# Patient Record
Sex: FEMALE | Race: WHITE | NOT HISPANIC OR LATINO | Employment: OTHER | URBAN - METROPOLITAN AREA
[De-identification: names, ages, dates, MRNs, and addresses within clinical notes are randomized per-mention and may not be internally consistent; named-entity substitution may affect disease eponyms.]

---

## 2017-01-03 ENCOUNTER — OFFICE VISIT (OUTPATIENT)
Dept: INTERNAL MEDICINE | Facility: CLINIC | Age: 77
End: 2017-01-03
Payer: MEDICARE

## 2017-01-03 ENCOUNTER — LAB VISIT (OUTPATIENT)
Dept: LAB | Facility: HOSPITAL | Age: 77
End: 2017-01-03
Attending: PHYSICIAN ASSISTANT
Payer: MEDICARE

## 2017-01-03 VITALS
OXYGEN SATURATION: 99 % | HEART RATE: 86 BPM | TEMPERATURE: 99 F | DIASTOLIC BLOOD PRESSURE: 72 MMHG | WEIGHT: 119.5 LBS | HEIGHT: 61 IN | BODY MASS INDEX: 22.56 KG/M2 | SYSTOLIC BLOOD PRESSURE: 102 MMHG

## 2017-01-03 DIAGNOSIS — R17 ELEVATED BILIRUBIN: Primary | ICD-10-CM
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
## 2017-01-03 DIAGNOSIS — D64.9 MILD ANEMIA: ICD-10-CM

## 2017-01-03 LAB
ALBUMIN SERPL BCP-MCNC: 3.8 G/DL
ALP SERPL-CCNC: 79 U/L
ALT SERPL W/O P-5'-P-CCNC: 15 U/L
ANION GAP SERPL CALC-SCNC: 11 MMOL/L
AST SERPL-CCNC: 20 U/L
BASOPHILS # BLD AUTO: 0.02 K/UL
BASOPHILS NFR BLD: 0.2 %
BILIRUB SERPL-MCNC: 1.4 MG/DL
BUN SERPL-MCNC: 17 MG/DL
CALCIUM SERPL-MCNC: 10.1 MG/DL
CHLORIDE SERPL-SCNC: 103 MMOL/L
CO2 SERPL-SCNC: 24 MMOL/L
CREAT SERPL-MCNC: 0.8 MG/DL
DIFFERENTIAL METHOD: ABNORMAL
EOSINOPHIL # BLD AUTO: 0.1 K/UL
EOSINOPHIL NFR BLD: 0.5 %
ERYTHROCYTE [DISTWIDTH] IN BLOOD BY AUTOMATED COUNT: 13.4 %
EST. GFR  (AFRICAN AMERICAN): >60 ML/MIN/1.73 M^2
EST. GFR  (NON AFRICAN AMERICAN): >60 ML/MIN/1.73 M^2
GLUCOSE SERPL-MCNC: 132 MG/DL
HCT VFR BLD AUTO: 33.2 %
HGB BLD-MCNC: 11.2 G/DL
LYMPHOCYTES # BLD AUTO: 2 K/UL
LYMPHOCYTES NFR BLD: 19.5 %
MCH RBC QN AUTO: 32.8 PG
MCHC RBC AUTO-ENTMCNC: 33.7 %
MCV RBC AUTO: 97 FL
MONOCYTES # BLD AUTO: 0.8 K/UL
MONOCYTES NFR BLD: 8.3 %
NEUTROPHILS # BLD AUTO: 7.3 K/UL
NEUTROPHILS NFR BLD: 71.5 %
PLATELET # BLD AUTO: 346 K/UL
PMV BLD AUTO: 9.1 FL
POTASSIUM SERPL-SCNC: 4.2 MMOL/L
PROT SERPL-MCNC: 7.7 G/DL
RBC # BLD AUTO: 3.41 M/UL
SODIUM SERPL-SCNC: 138 MMOL/L
WBC # BLD AUTO: 10.14 K/UL

## 2017-01-03 PROCEDURE — 99999 PR PBB SHADOW E&M-EST. PATIENT-LVL IV: CPT | Mod: PBBFAC,,, | Performed by: PHYSICIAN ASSISTANT

## 2017-01-03 PROCEDURE — 80053 COMPREHEN METABOLIC PANEL: CPT | Mod: PO

## 2017-01-03 PROCEDURE — 36415 COLL VENOUS BLD VENIPUNCTURE: CPT | Mod: PO

## 2017-01-03 PROCEDURE — 99213 OFFICE O/P EST LOW 20 MIN: CPT | Mod: S$PBB,,, | Performed by: PHYSICIAN ASSISTANT

## 2017-01-03 PROCEDURE — 85025 COMPLETE CBC W/AUTO DIFF WBC: CPT | Mod: PO

## 2017-01-03 RX ORDER — LISINOPRIL 5 MG/1
5 TABLET ORAL DAILY
COMMUNITY
End: 2017-11-01

## 2017-01-03 NOTE — PROGRESS NOTES
"  Subjective:      Patient ID: Aicha Rolle is a 76 y.o. female.    Chief Complaint: Fall    HPI Comments: Does admit to some weakness and mild dizziness in the past few days lasting a few minutes. Reports to "feeling like she is in a bubble". Doesn't feel like herself since this morning. Saw her ortho doc after the fall, did x-rays of hip and back, no acute findings. Did not have any imaging of her head.   On plavix. Pt states that she stopped the plavix 2 days ago.   Denies any confusion, visual changes, or weakness.    Fall   The accident occurred 5 to 7 days ago. The fall occurred from a stool. She landed on hard floor. The volume of blood lost was minimal. The point of impact was the head. The pain is present in the buttocks. Associated symptoms include abdominal pain and headaches (mild sinus headache today, no headache after fall). Pertinent negatives include no bowel incontinence, fever, hearing loss, hematuria, loss of consciousness, nausea, numbness, tingling, visual change or vomiting. She has tried nothing for the symptoms. The treatment provided no relief.       Review of Systems   Constitutional: Negative for activity change, appetite change, chills, diaphoresis, fatigue, fever and unexpected weight change.   HENT: Positive for congestion, ear pain and sinus pressure. Negative for dental problem, drooling, ear discharge, facial swelling, hearing loss, postnasal drip, rhinorrhea, sore throat and tinnitus.    Respiratory: Negative for chest tightness, shortness of breath and wheezing.    Cardiovascular: Negative for chest pain, palpitations and leg swelling.   Gastrointestinal: Positive for abdominal pain. Negative for abdominal distention, anal bleeding, blood in stool, bowel incontinence, constipation, diarrhea, nausea, rectal pain and vomiting.   Genitourinary: Negative for hematuria.   Musculoskeletal: Positive for arthralgias and myalgias.   Neurological: Positive for dizziness and headaches (mild " "sinus headache today, no headache after fall). Negative for tingling, loss of consciousness, facial asymmetry and numbness.     Objective:     Visit Vitals    /72 (BP Location: Right arm, Patient Position: Sitting, BP Method: Manual)    Pulse 86    Temp 99 °F (37.2 °C) (Tympanic)    Ht 5' 1" (1.549 m)    Wt 54.2 kg (119 lb 7.8 oz)    SpO2 99%    BMI 22.58 kg/m2       Physical Exam   Constitutional: She is oriented to person, place, and time. She appears well-developed and well-nourished.   HENT:   Head: Normocephalic and atraumatic.       Right Ear: External ear normal.   Left Ear: External ear normal.   Nose: Nose normal.   Mouth/Throat: Oropharynx is clear and moist.   Eyes: Conjunctivae and EOM are normal. Pupils are equal, round, and reactive to light.   Neck: Normal range of motion. Neck supple.   Cardiovascular: Normal rate, regular rhythm and normal heart sounds.  Exam reveals no gallop and no friction rub.    No murmur heard.  Pulmonary/Chest: Effort normal and breath sounds normal. No respiratory distress. She has no wheezes. She has no rales. She exhibits no tenderness.   Abdominal: Soft. She exhibits no distension. There is no tenderness.   Musculoskeletal: Normal range of motion.   Lymphadenopathy:     She has no cervical adenopathy.   Neurological: She is alert and oriented to person, place, and time. She has normal strength. No cranial nerve deficit or sensory deficit. She exhibits normal muscle tone. Coordination and gait normal.   Skin: Skin is warm and dry.   Psychiatric: She has a normal mood and affect. Her behavior is normal. Judgment and thought content normal.   Vitals reviewed.      Assessment:     1. Fall, subsequent encounter      Plan:   Fall, subsequent encounter  -     CBC auto differential; Future; Expected date: 1/3/17  -     Comprehensive metabolic panel; Future    -continue to monitor. If notice any worsening of headache, dizziness, visual changes, weakness, n/v, " incontinence, confusion, go to ER.   -a handout on concussions and head injury and warning signs sent home with the patient today.  -restart plavix    Return if symptoms worsen or fail to improve.

## 2017-01-03 NOTE — MR AVS SNAPSHOT
[facility]  [address] [facility] [address] [place] [address]  Phone: [phone]  Fax: [phone]                  Aicha Rolle   1/3/2017 11:20 AM   Office Visit    Description:  Female : 1940   Provider:  Yana Rascon PA-C   Department:  [facility]           Reason for Visit     Fall                To Do List           Goals (5 Years of Data)     None      [facility]     [facility] On Call Nurse Care Line -  Assistance  Registered nurses in the [facility] Call Center provide clinical advisement, health education, appointment booking, and other advisory services.  Call for this free service at [phone].             Medications           Message regarding Medications     Verify the changes and/or additions to your medication regime listed below are the same as discussed with your clinician today.  If any of these changes or additions are incorrect, please notify your healthcare provider.             Verify that the below list of medications is an accurate representation of the medications you are currently taking.  If none reported, the list may be blank. If incorrect, please contact your healthcare provider. Carry this list with you in case of emergency.           Current Medications     atorvastatin (LIPITOR) 80 MG tablet Take 1 tablet (80 mg total) by mouth once daily.    biotin 1 mg tablet Take 1,000 mcg by mouth once daily.    CALCIUM CARBONATE/VITAMIN D3 (CALTRATE 600 + D ORAL) Take 1 tablet by mouth once daily.    carvedilol (COREG) 25 MG tablet Take 25 mg by mouth 2 (two) times daily with meals. Patient states taking 1/2 pill twice daily    clopidogrel (PLAVIX) 75 mg tablet Take 75 mg by mouth once daily.     cyanocobalamin (VITAMIN B-12) 250 MCG tablet Take 250 mcg by mouth once daily.    fluticasone (FLONASE) 50 mcg/actuation nasal spray INSTILL 2 SPRAYS IN EACH NOSTRIL EVERY EVENING    lisinopril (PRINIVIL,ZESTRIL) 5 MG tablet Take 5 mg by mouth  "once daily.    NITROSTAT 0.4 mg SL tablet            Clinical Reference Information           Vital Signs - Last Recorded  Most recent update: 1/3/2017 11:28 AM by Brandy Buchanan LPN    BP Pulse Temp Ht Wt SpO2    102/72 (BP Location: Right arm, Patient Position: Sitting, BP Method: Manual) 86 99 °F (37.2 °C) (Tympanic) 5' 1" (1.549 m) 54.2 kg (119 lb 7.8 oz) 99%    BMI                22.58 kg/m2          Blood Pressure          Most Recent Value    BP  102/72      Allergies as of 1/3/2017     Sulfa (Sulfonamide Antibiotics)      Immunizations Administered on Date of Encounter - 1/3/2017     None      [facility] Sign-Up     Activating your [facility] account is as easy as 1-2-3!     1) Visit [email or web address], select Sign Up Now, enter this activation code and your date of birth, then select Next.  [email or web address]  Expires: 2/17/2017 11:48 AM      2) Create a username and password to use when you visit [facility] in the future and select a security question in case you lose your password and select Next.    3) Enter your e-mail address and click Sign Up!    Additional Information  If you have questions, please e-mail [email or web address] or call [phone] to talk to our [facility] staff. Remember, [facility] is NOT to be used for urgent needs. For medical emergencies, dial 911.         "

## 2017-01-03 NOTE — PATIENT INSTRUCTIONS
"  Concussion    A concussion can be caused by a direct blow to the head, neck, face, or somewhere else on the body with the force being transmitted to the head. This may cause you to lose consciousness - be "knocked out" - but not always. Depending on the severity of the blow, it will take from a few hours up to a few days to get better. Sometimes symptoms may last a few months or longer. This is called post-concussion syndrome.  At first, you may have a headache, nausea, vomiting, or dizziness. You may also have problems concentrating or remembering things. This is normal.  Symptoms should get better as the hours and days go by. Symptoms that get worse could be a sign of a more serious injury. This might be a bruise or bleeding in the brain. Thats why its important to watch for the warning signs listed below.  Home care  If your injury is mild and there are no serious signs or symptoms, your healthcare provider may recommend that you be monitored at home. If there is evidence that the injury is more serious, you will be monitored in the hospital. Follow these tips to help care for yourself at home:  · After a concussion, your healthcare provider may recommend that a family member or friend monitor you for 12 to 24 hours. They may be told to wake you every few hours during sleep to check for the signs below.  · If your face or scalp swells, apply an ice pack for 20 minutes every 1 to 2 hours. Do this until the swelling starts to go down. You can make an ice pack by putting ice cubes in a plastic bag and wrapping the bag in a towel.  · You may use acetaminophen to control pain, unless another pain medicine was prescribed. Do not use aspirin or ibuprofen after a head injury. If you have chronic liver or kidney disease, talk with your doctor before using these medicines. Also talk with your doctor if you ever had a stomach ulcer or gastrointestinal bleeding.  · For the next 24 hours:  ¨ Dont drink alcohol or take " sedatives or medicines that make you sleepy.  ¨ Dont drive or operate machinery.  ¨ Avoid doing anything strenuous. Dont lift or strain.  · Dont return to sports or any activity that could cause you to hit your head until all symptoms are gone and you have been cleared by your doctor. A second head injury before fully recovering from the first one can lead to serious brain injury.  · Avoid doing activities that require a lot of concentration or a lot of attention. This will allow your brain to rest and heal quicker.  Follow-up care  Follow up with your doctor in 1 week, or as directed.  Note: A radiologist will review any X-rays or CT scans that were taken. You will be told of any new findings that may affect your care.  When to seek medical advice  Call your healthcare provider right away if any of these occur:  · Repeated vomiting  · Headache or dizziness that is severe or gets worse  · Loss of consciousness  · Unusual drowsiness, or unable to wake up as usual  · Weakness or decreased ability to walk or move any limb  · Confusion, agitation, or change in behavior or speech, or memory loss  · Blurred vision  · Convulsion (seizure)  · Swelling on the scalp or face that gets worse  · Changes in pupil size (the black part of the eye)  · Redness, warmth, or pus from the swollen area  · Fluid draining from or bleeding from the nose or ears     © 9293-4324 [employer] [address] [place] [address], [place] [address]. All rights reserved. This information is not intended as a substitute for professional medical care. Always follow your healthcare professional's instructions.        After a Concussion  If you or someone close to you has had a mild concussion (a head injury), watch closely for signs of problems during the first 48 hours after the injury. Follow the doctors advice about recovering at home. Use the tips on this handout as a guide.  Call 911 or your emergency number if the person with the  concussion will not fully wake up or has seizures or convulsions.   The first 48 hours     Awaken to check alertness as often as the health care provider suggests.      Dont take medicine unless approved by your healthcare provider. Try placing a cold, damp cloth on the head to help relieve a headache.  · Ask the doctor before using any medicines.  · Don't drink alcohol or take sedatives or medicines that make you sleepy.  · Don't return to sports or any activity that could cause you to hit your head until all symptoms are gone and you have been cleared by your doctor. A second head injury before fully recovering from the first one can lead to serious brain injury.  · Avoid doing activities that require a lot of concentration or a lot of attention. This will allow your brain to rest and heal more quickly.  · Return to regular physical and mental activity as directed and approved by your healthcare provider.  Tips about sleeping  For the first day or two, it may be best not to sleep for long periods of time without being checked for alertness. Follow the doctors instructions.  ? Wake every ____ hours for the next ____ hours. Ask questions to check for alertness.  ? OK to sleep through the night.  Note: A person should not be left alone after a concussion. If no adult can stay with the injured person, let the doctor know.  When to call the doctor  If you notice any of the following, call the doctor or healthcare provider:  · Vomiting (some vomiting is common, but tell the doctor about any vomiting)  · Clear or bloody drainage from the nose or ear  · Constant drowsiness or difficulty in waking up  · Confusion or memory loss  · Blurred vision or any vision changes  · Inability to walk or talk normally  · Increased weakness or problems with coordination  · Constant, unrelieved headache that becomes more severe  · Changes in behavior or personality  · High-pitched crying in infants   © 8646-3174 [employer],  [employer]. [address] [place] [address]. All rights reserved. This information is not intended as a substitute for professional medical care. Always follow your healthcare professional's instructions.        Coping with Concussion  Concussion is also known as mild traumatic brain injury (MTBI). It is often caused by a blow to the head, or a fall. You may have been unconscious for a few seconds or minutes after the injury. Or maybe you were dazed, confused, or saw stars. After this, you thought you were OK. Now, weeks or months later, youre having symptoms that may be caused by a concussion. The good news is that, in most people,  these symptoms will likely go away on their own. Most people with a concussion recover fully, with no need for treatment.     A cold compress can help relieve a headache.    What is a concussion?  A concussion is a mild form of brain injury. In some cases, the effects of a concussion go away within days of the injury. In others, symptoms may continue for a few months. Fortunately, a concussion is temporary. Even when symptoms stay for months, they do go away over time. If they don't, or if your symptoms are worse, contact your healthcare provider.  Symptoms of a concussion  You may have noticed some of these symptoms:  · Headaches  · Irritability and other changes in behavior  · Problems remembering or concentrating  · Dizziness or lack of coordination  · Fatigue  · Problems sleeping  · Sensitivity to light and sound  · Vision changes  NOTE: If you have severe symptoms or trouble functioning, talk with your healthcare provider right away. If you had a more serious head injury than a concussion, you likely need treatment. Be sure to see your healthcare provider for an evaluation.   What you can do  Since the effects of a concussion go away over time, there isnt a lot you need to do. Be assured that this problem is temporary. Youll likely have a full recovery. In the meantime,  talk with your healthcare provider about ways to relieve any symptoms that are bothering you. These tips may help:  · Don't return to sports or any activity that could cause you to hit your head until all symptoms are gone and you have been cleared by your doctor. A second head injury before fully recovering from the first one can lead to serious brain injury.  · Avoid doing activities that require a lot of concentration or a lot of attention. This will allow your brain to rest and heal more quickly.  · When you have a headache, put a cold compress or ice pack on the pain site. Rest in a quiet, darkened room.  · Stress can make symptoms worse. Help calm yourself by resting in a quiet place and imagining a peaceful scene. Relax your muscles by soaking in a hot bath or taking a hot shower.  · Take over-the-counter  acetaminophen to relieve headache pain. Take them as directed on the package. Do not take ibuprofen or aspirin after a head injury.  · If you become dizzy, sit or lie down in a safe place until the sensation passes. Dont drive when you feel dizzy or disoriented.  · If youre having trouble sleeping, try to keep a regular sleep schedule. Go to bed and get up at the same time each day. Avoid or limit caffeine and nicotine. Also avoid alcohol. It may help you sleep at first, but your sleep will not be restful.  · Give yourself time to heal. Your recovery will take some time. When you have symptoms, remember that you wont feel this way forever. In time the symptoms will go away and youll be back to yourself.  If youre not feeling better  The effects of a concussion often go away in 7 to 10 days and the vast majority of people who have had a concussion have recovered after 3 months. If youre not feeling better as time passes, there may be something else going on. If your symptoms dont go away or you notice new ones, talk with your healthcare provider. He or she can help you get the treatment you need.   ©  1140-8343 [employer]. [address] [place] [address], [place] [address]. All rights reserved. This information is not intended as a substitute for professional medical care. Always follow your healthcare professional's instructions.        Discharge Instructions for Concussion  You have been diagnosed with a concussion, a type of brain injury caused by a sudden impact to your head. It can also be caused by sudden movement of your brain inside your head, such as from forceful shaking. Some concussions are mild, and most patients have a full recovery. Others are severe. Early care and monitoring are important to prevent long-term complications.  Home care  Do's and don'ts:   · Ask a friend or family member to stay with you for a few days. You should not be alone until you know how the injury has affected you.  · Tell your caregiver to wake you every 2 to 3 hours during the first night. Your caregiver should call 911 if he or she cant wake you, or if you are confused.  · Dont take any medicine--not even aspirin--unless your healthcare provider says it's OK. If you have a headache, try placing a cold, damp cloth on your forehead.  · Eat light. Clear liquids, such as broth or gelatin, are good choices.  · Don't drink alcohol or use any recreational drugs.   · Rest for 2 to 3 days; then slowly return to normal activities, such as school or work, if your healthcare provider allows. Avoid contact activities, such as football and hockey, until you are completely free of symptoms and your healthcare provider says it's OK.  Follow-up  Make a follow-up appointment.  When to seek medical attention  Your caregiver should call 911 right away if you have fallen asleep, cannot be awakened, or you are confused.  Otherwise, call your healthcare provider immediately if you have:  · Vomiting  · Clear or bloody drainage from your nose or ear  · Constant drowsiness or trouble waking up  · Confusion or memory loss  · Blurred  vision  · Trouble walking, talking, or concentrating  · Increased weakness or problems with coordination  · Constant headache that cant be relieved or gets worse  · Changes in behavior or personality   © 9484-5161 [employer]. [address] [place] [address], [place] [address]. All rights reserved. This information is not intended as a substitute for professional medical care. Always follow your healthcare professional's instructions.        Head Injury (Adult)    You have a head injury. It does not appear serious at this time. But symptoms of a more serious problem, such as a mild brain injury (concussion) or bruising or bleeding in the brain, may appear later. For this reason, you or someone caring for you will need to watch for the symptoms listed below. Once youre home, also be sure to follow any care instructions youre given.  Home care  Watch for the following symptoms  Seek emergency medical care if you have any of these symptoms over the next hours to days:   · Headache  · Nausea or vomiting  · Dizziness  · Sensitivity to light or noise  · Unusual sleepiness or grogginess  · Trouble falling asleep  · Personality changes  · Vision changes  · Memory loss  · Confusion  · Trouble walking or clumsiness  · Loss of consciousness (even for a short time)  · Inability to be awakened  · Stiff neck  · Weakness or numbness in any part of the body  · Seizures  General care  · If you were prescribed medicines for pain, use them as directed. Note: Dont take other medicines for pain without talking to your provider first.  · To help reduce swelling and pain, apply a cold source to the injured area for up to 20 minutes at a time. Do this as often as directed. Use a cold pack or bag of ice wrapped in a thin towel. Never apply a cold source directly to the skin.  · If you have cuts or scrapes as a result of your head injury, care for them as directed.  · For the next 24 hours (or longer, if instructed):  ¨ Dont drink  alcohol or use sedatives or other medicines that make you sleepy.  ¨ Dont drive or operate machinery.  ¨ Dont do anything strenuous, such as heavy lifting or straining.  ¨ Limit tasks that require concentration. This includes reading, using a smartphone or computer, watching TV, and playing video games.  ¨ Dont return to sports or other activities that could result in another head injury.  Follow-up care  Follow up with your healthcare provider, or as directed. If imaging tests were done, they will be reviewed by a doctor. You will be told the results and any new findings that may affect your care.  When to seek medical advice  Call your healthcare provider right away if any of these occur:  · Pain doesnt get better or worsens  · New or increased swelling or bruising  · Fever of 100.4°F (38°C) or higher, or as directed by your provider  · Increased redness, warmth, drainage, or bleeding from the injured area  · Fluid drainage or bleeding from the nose or ears  · Any depression or bony abnormality in the injured area  © 3273-4129 [employer]. [address] [place] [address]. All rights reserved. This information is not intended as a substitute for professional medical care. Always follow your healthcare professional's instructions.        What is Mild Traumatic Brain Injury (Concussion)?  A mild traumatic brain injury (TBI) is a sudden jolt to your head that causes the way your brain works to change temporarily. It is also called a concussion. This could be caused by a blow to your head, a blast, or a sudden and severe movement of your head that bounces your brain inside your skull. Falls, fights, sports, and motor vehicle accidents are other common causes.  In addition to mild TBI, there are two other types: moderate TBI and severe TBI. Your healthcare team will decide if your TBI is mild, moderate, or severe at the time of the injury. Sometimes the symptoms of a mild TBI are much like  those of a more severe TBI. Because every brain is different, it can be hard to predict exactly what your symptoms or your specific recovery will be like.  Diagnosing a mild TBI  Most TBIs are mild. If you have a mild TBI, you might be knocked out for a short time or you might just feel stunned for a while. Your healthcare provider may evaluate you to see if you have had a mild TBI.   Diagnosing a mild TBI may be difficult because symptoms are similar to those of other conditions and signs may not show up on brain scans or X-rays. Your healthcare provider may base your diagnosis on the following criteria;  · Characteristics of the head injury  · The types and severity of your symptoms  · Risk factors that can lead to longer recovery period  Symptoms of a mild TBI  Having a mild TBI can change your brain in many ways. A mild TBI can change the way you think, feel, or act. The kind of symptoms you have depends on the location and extent that your brain is affected. Common symptoms of mild TBI can occur right away or a while after the injury. Early symptoms may include:  · Headache  · Dizziness  · Not being sure of where you are  · Memory problems  · Being sick to your stomach  · Vomiting  · Vision problems  Later symptoms of a mild TBI may include:  · Having frequent headaches  · Feeling light-headed  · Not being able to concentrate or pay attention  · Feeling tired most of the time  · Getting angry and irritated easily  · Being bothered by bright light or loud noise  · Having trouble focusing your eyes  · Hearing ringing in your ears  · Feeling anxious and depressed  Recovering from a mild TBI  Most people recover completely from mild TBI, but it may take days, weeks, or months. For some, symptoms may last even longer. Also, if you have had more than one TBI, your recovery may take longer. Every brain is different, so your recovery time will depend on how quickly your own brain is healing.  Don't return to sports or  any activity that could cause you to hit your head until all symptoms are gone and you have been cleared by your doctor. A second head injury before fully recovering from the first one can lead to serious brain injury.  Avoid doing activities that require a lot of concentration or a lot of attention. This will allow your brain to rest and heal more quickly.  You can expect to have some good days and some bad days. It is important to give your brain time to recover and not push yourself too hard. Trying to tough it out can make your symptoms worse. The best way to recover is to discuss symptoms with your healthcare provider, as well as your family. Work closely with your healthcare provider and give your brain time to heal.  © 4838-8395 [employer]. [address] [place] [address], [place] [address]. All rights reserved. This information is not intended as a substitute for professional medical care. Always follow your healthcare professional's instructions.        Managing Post-Traumatic Headaches After Traumatic Brain Injury  A traumatic brain injury (TBI) is a sudden jolt to your head that changes the way your brain works. Its not surprising that headache would be the most common physical symptom after a brain injury. Because a jolt to your head also causes a jolt to your neck, headaches with neck pain are the most common types of pain after a TBI.  The type of headache you get does not depend on the severity of your TBI is. Thats because symptoms after a TBI are unpredictable. You could have a mild TBI but still have very painful headaches. Also, having headaches can make your other TBI symptoms worse, and other TBI symptoms can make your headaches worse. Because of this, its important to learn how to manage your headaches.  Types of headaches after a TBI   Your headaches may be mild or severe. They may come and go, or you may have them all the time. TBI symptoms, such as trouble sleeping, anxiety,  fatigue, depression, slowed thinking, and memory loss, can all be made worse by headaches.  Here are some common types of TBI headaches:  · Headaches that start with pain in the back of your neck and spread to your head. These headaches get worse as the day goes on. They are more likely if your TBI includes a neck injury. This type of headache is often called a tension headache. It is probably the most common type of TBI headache.  · Migraine headaches. These may be triggered by TBI, especially if you have a family history of migraines. A migraine headache is a pounding headache, usually on one side of your head. Its caused by abnormal blood flow to your brain. Stressful symptoms of a TBI may trigger a migraine attack.  Managing TBI headaches  Strong pain medicines, called opiates or narcotics, are usually not the answer for TBI headaches. These medicines can make other TBI symptoms worse. They also can have unpredictable side effects in a person with a TBI.  If pain medicines are needed, the first choice is a nonnarcotic medicine. Examples include over-the-counter pain relievers like acetaminophen or ibuprofen. If you use pain relief medicines for a headache more than 3 days a week, you need to watch if your headaches are getting worse. This is called rebound headache.  The best way to manage your headaches is with self-care, also called headache hygiene. Here are some self-care tips:  · Take medicines only as your healthcare provider prescribes them. Dont take any medicines on your own.  · If you start getting a headache, try to find a dark, quiet place where you can lie down.  · Wear dark glasses if bright lights seem to trigger your headaches.  · Avoid any foods and beverages that seem to trigger a headache.  · Learn to avoid stress and relax by using techniques like listening to music, meditating, or deep breathing. If needed, work with a mental health expert to reduce stress and anxiety.  · Get daily  exercise. This helps your body and your brain.  · Go to bed and get up at the same time every day.  · Avoid caffeine, alcohol, and tobacco.  · Take part in physical therapy to stretch and strengthen your muscles. Learn how to do these exercises at home.  · Think about trying alternative treatments like acupuncture or massage therapy.  · Keep a headache journal to share with your healthcare provider. Write down every time you get a headache, how severe it is, and what seemed to have triggered it.  TBI headaches usually go away with time. How long it takes your brain to recover depends on the type of injury you had. Managing headache pain with self-care can help you heal faster. Call your healthcare provider if your pain is getting worse. And remember, never try to treat headaches on your own with drugs or alcohol.  © 6162-4539 [employer], [employer]. [address] [place] [address], [place] [address]. All rights reserved. This information is not intended as a substitute for professional medical care. Always follow your healthcare professional's instructions.

## 2017-01-04 ENCOUNTER — TELEPHONE (OUTPATIENT)
Dept: INTERNAL MEDICINE | Facility: CLINIC | Age: 77
End: 2017-01-04

## 2017-01-04 NOTE — TELEPHONE ENCOUNTER
Spoke to pt. Pt states she has blood in her urine  And blood when she wipes. i recommend pt schedule an appt to be seen. Pt states she will come in radha. Pt verbalized understanding

## 2017-01-04 NOTE — TELEPHONE ENCOUNTER
----- Message from Milena Denton sent at 1/4/2017  2:19 PM CST -----  Contact: pt  Pt requests nurse to call her at [phone] regarding if she need to have a urine test performed due to seeing a little blood on tissue after urinating.

## 2017-01-05 ENCOUNTER — LAB VISIT (OUTPATIENT)
Dept: LAB | Facility: HOSPITAL | Age: 77
End: 2017-01-05
Attending: PEDIATRICS
Payer: MEDICARE

## 2017-01-05 ENCOUNTER — OFFICE VISIT (OUTPATIENT)
Dept: INTERNAL MEDICINE | Facility: CLINIC | Age: 77
End: 2017-01-05
Payer: MEDICARE

## 2017-01-05 VITALS
SYSTOLIC BLOOD PRESSURE: 120 MMHG | TEMPERATURE: 97 F | BODY MASS INDEX: 22.73 KG/M2 | HEIGHT: 61 IN | DIASTOLIC BLOOD PRESSURE: 74 MMHG | WEIGHT: 120.38 LBS

## 2017-01-05 DIAGNOSIS — R31.9 HEMATURIA: ICD-10-CM

## 2017-01-05 DIAGNOSIS — R31.9 HEMATURIA: Primary | ICD-10-CM

## 2017-01-05 DIAGNOSIS — N30.90 CYSTITIS: ICD-10-CM

## 2017-01-05 LAB
BACTERIA #/AREA URNS HPF: ABNORMAL /HPF
BILIRUB UR QL STRIP: NEGATIVE
CLARITY UR: CLEAR
COLOR UR: ABNORMAL
GLUCOSE UR QL STRIP: NEGATIVE
HGB UR QL STRIP: ABNORMAL
KETONES UR QL STRIP: ABNORMAL
LEUKOCYTE ESTERASE UR QL STRIP: ABNORMAL
MICROSCOPIC COMMENT: ABNORMAL
NITRITE UR QL STRIP: NEGATIVE
PH UR STRIP: 6 [PH] (ref 5–8)
PROT UR QL STRIP: NEGATIVE
RBC #/AREA URNS HPF: 10 /HPF (ref 0–4)
SP GR UR STRIP: 1.02 (ref 1–1.03)
SQUAMOUS #/AREA URNS HPF: 2 /HPF
URN SPEC COLLECT METH UR: ABNORMAL
WBC #/AREA URNS HPF: 15 /HPF (ref 0–5)

## 2017-01-05 PROCEDURE — 87086 URINE CULTURE/COLONY COUNT: CPT

## 2017-01-05 PROCEDURE — 81000 URINALYSIS NONAUTO W/SCOPE: CPT | Mod: PO

## 2017-01-05 PROCEDURE — 99999 PR PBB SHADOW E&M-EST. PATIENT-LVL III: CPT | Mod: PBBFAC,,, | Performed by: PHYSICIAN ASSISTANT

## 2017-01-05 PROCEDURE — 99213 OFFICE O/P EST LOW 20 MIN: CPT | Mod: S$PBB,,, | Performed by: PHYSICIAN ASSISTANT

## 2017-01-05 RX ORDER — CIPROFLOXACIN 500 MG/1
500 TABLET ORAL DAILY
Qty: 10 TABLET | Refills: 0 | Status: SHIPPED | OUTPATIENT
Start: 2017-01-05 | End: 2017-01-15

## 2017-01-05 NOTE — PROGRESS NOTES
Subjective:       Patient ID: Aicha Rolle is a 76 y.o.W/ female.    Chief Complaint: Urinary Tract Infection    HPI         She comes in today accompanied by her  and has the above problems.  She noticed a clot of blood in her urine this morning and is worried that she might have a urinary tract infection.  She has also had some slight vaginal discharge.  She hasn't noticed any blood on her toilet paper after wiping.  There has been no blood in the water or pink tinge to the water.  She has no fever, sense of urgency, polyuria, dysuria, pyuria, nocturia, suprapubic or low backache, or GI symptoms of nausea anorexia or vomiting.  She did have a urinary tract infection about 13 months ago in December 2015.  At that time she had a lot more symptoms that she has now.    Review of Systems    Otherwise negative concerning the GI, MUSCULOSKELETAL, ORTHOPEDIC,  and GYN systems review.    Objective:      Physical Exam   See urine report located elsewhere in the lab section.     Assessment:       1. Hematuria    2. Cystitis        Plan:     1.  Will start her on Cipro 500 mg daily ×7 days.  She is ALLERGIC to sulfa meds.  Follow-up in 7 days if she has anymore symptoms or if the symptoms persist.

## 2017-01-06 LAB
BACTERIA UR CULT: NORMAL
BACTERIA UR CULT: NORMAL

## 2017-01-20 ENCOUNTER — TELEPHONE (OUTPATIENT)
Dept: INTERNAL MEDICINE | Facility: CLINIC | Age: 77
End: 2017-01-20

## 2017-01-20 NOTE — TELEPHONE ENCOUNTER
Contacted pt notified that she would have to come in to an appt and give a urine sample. Pt verbalized understanding, appt is 1/24/17 9:00AM.

## 2017-01-20 NOTE — TELEPHONE ENCOUNTER
----- Message from Apple Hicks sent at 1/20/2017  2:24 PM CST -----  Contact: pt  States she had a UTI 2 weeks ago and she has finished the medicine.  States is having some burning and frequent urination again and she would like to get something called in before the weekend. Pt uses     [facility] [place] - [address] [facility]  [id number] [place] [address].  [address]  [facility] [address]  Phone: [phone] Fax: [phone]    Please call pt at [phone] or [phone]. Thank you

## 2017-01-24 ENCOUNTER — LAB VISIT (OUTPATIENT)
Dept: LAB | Facility: HOSPITAL | Age: 77
End: 2017-01-24
Attending: PEDIATRICS
Payer: MEDICARE

## 2017-01-24 ENCOUNTER — OFFICE VISIT (OUTPATIENT)
Dept: INTERNAL MEDICINE | Facility: CLINIC | Age: 77
End: 2017-01-24
Payer: MEDICARE

## 2017-01-24 VITALS
DIASTOLIC BLOOD PRESSURE: 76 MMHG | TEMPERATURE: 97 F | WEIGHT: 111.13 LBS | HEIGHT: 61 IN | SYSTOLIC BLOOD PRESSURE: 112 MMHG | BODY MASS INDEX: 20.98 KG/M2 | RESPIRATION RATE: 14 BRPM | HEART RATE: 61 BPM

## 2017-01-24 DIAGNOSIS — R30.0 DYSURIA: ICD-10-CM

## 2017-01-24 DIAGNOSIS — N30.21 HEMATURIA DUE TO CHRONIC CYSTITIS: Primary | ICD-10-CM

## 2017-01-24 DIAGNOSIS — R30.0 DYSURIA: Primary | ICD-10-CM

## 2017-01-24 LAB
BILIRUB UR QL STRIP: NEGATIVE
CLARITY UR: CLEAR
COLOR UR: YELLOW
GLUCOSE UR QL STRIP: NEGATIVE
HGB UR QL STRIP: ABNORMAL
KETONES UR QL STRIP: NEGATIVE
LEUKOCYTE ESTERASE UR QL STRIP: NEGATIVE
MICROSCOPIC COMMENT: ABNORMAL
NITRITE UR QL STRIP: NEGATIVE
PH UR STRIP: 6 [PH] (ref 5–8)
PROT UR QL STRIP: NEGATIVE
RBC #/AREA URNS HPF: 6 /HPF (ref 0–4)
SP GR UR STRIP: 1.02 (ref 1–1.03)
URN SPEC COLLECT METH UR: ABNORMAL
WBC #/AREA URNS HPF: 3 /HPF (ref 0–5)

## 2017-01-24 PROCEDURE — 99214 OFFICE O/P EST MOD 30 MIN: CPT | Mod: PBBFAC,PO | Performed by: PHYSICIAN ASSISTANT

## 2017-01-24 PROCEDURE — 99213 OFFICE O/P EST LOW 20 MIN: CPT | Mod: S$PBB,,, | Performed by: PHYSICIAN ASSISTANT

## 2017-01-24 PROCEDURE — 99999 PR PBB SHADOW E&M-EST. PATIENT-LVL IV: CPT | Mod: PBBFAC,,, | Performed by: PHYSICIAN ASSISTANT

## 2017-01-24 NOTE — PROGRESS NOTES
Subjective:       Patient ID: Aicha Rolle is a 76 y.o.W/ female.    Chief Complaint: Urinary Tract Infection and Fall (fell on tail bone last month still having pain )    HPI         She comes in today accompanied by her  has the above complaint.  About one month ago she was climbing on a chair to get up into a kitchen cabinet and she ended up falling hard on her rump and that also hitting her head on the floor.  She did get seen within the first 24 hours by a orthopedic specialist and they did x-rays on her pelvis.  For the next 1-2 weeks she ambulated by using a cane but no walker.  She still walking with a little limp today.  She is due to follow-up with that specialist this week.        She has not had a UTI for several years.  When she thinks she is developing 1, she starts drinking cranberry juice and the symptoms usually go away.  She has had several urines run in the past 2 years that all showed low-grade hematuria.  So far she has not been evaluated in urology because of this problem.  She is not complaining at this time of any problem with dysuria, increased frequency, increased nocturia, hematuria that is visible, pyuria, change in the odor of the urine, vaginal itch or discharge, low backache in the kidney area etc.    Review of Systems    Otherwise negative concerning the RENAL, , GYN, ORTHOPEDIC, MUSCULOSKELETAL system review.    Objective:      Physical Exam    There was no physical exam today.  Urinalysis did show 1+ hematuria with change in microscopic.  It was also compared with 3 other urines done in the past 2 years.    Assessment:       1. Hematuria due to chronic cystitis        Plan:     1.  Will schedule her to have an evaluation in urology considering whether she needs to have cystoscopy done or not.  2.  Urine culture is pending.  I did not start her on any treatment for UTI at this time.

## 2017-01-24 NOTE — MR AVS SNAPSHOT
[facility]  [address] [place] [address]  Phone: [phone]  Fax: [phone]                  Aicha Rolle   2017 9:00 AM   Office Visit    Description:  Female : 1940   Provider:  Kirit Cowart PA-C   Department:  [facility]           Reason for Visit     Urinary Tract Infection     Fall           Diagnoses this Visit        Comments    Hematuria due to chronic cystitis    -  Primary            To Do List           Future Appointments        Provider Department Dept Phone    2017 2:20 PM MD Smita Choudhary IV - Urology [phone]    2017 10:45 AM LABORATORY, [facility] [phone]      Goals (5 Years of Data)     None      [facility]     [facility] On Call Nurse Care Line -  Assistance  Registered nurses in the [facility] Center provide clinical advisement, health education, appointment booking, and other advisory services.  Call for this free service at [phone].             Medications           Message regarding Medications     Verify the changes and/or additions to your medication regime listed below are the same as discussed with your clinician today.  If any of these changes or additions are incorrect, please notify your healthcare provider.             Verify that the below list of medications is an accurate representation of the medications you are currently taking.  If none reported, the list may be blank. If incorrect, please contact your healthcare provider. Carry this list with you in case of emergency.           Current Medications     atorvastatin (LIPITOR) 80 MG tablet Take 1 tablet (80 mg total) by mouth once daily.    biotin 1 mg tablet Take 1,000 mcg by mouth once daily.    CALCIUM CARBONATE/VITAMIN D3 (CALTRATE 600 + D ORAL) Take 1 tablet by mouth once daily.    carvedilol (COREG) 25 MG tablet Take 25 mg by mouth 2 (two) times daily with meals. Patient states taking  "1/2 pill twice daily    clopidogrel (PLAVIX) 75 mg tablet Take 75 mg by mouth once daily.     fluticasone (FLONASE) 50 mcg/actuation nasal spray INSTILL 2 SPRAYS IN EACH NOSTRIL EVERY EVENING    lisinopril (PRINIVIL,ZESTRIL) 5 MG tablet Take 5 mg by mouth once daily.    cyanocobalamin (VITAMIN B-12) 250 MCG tablet Take 250 mcg by mouth once daily.    NITROSTAT 0.4 mg SL tablet            Clinical Reference Information           Vital Signs - Last Recorded  Most recent update: 1/24/2017  9:16 AM by Yuli Frazier LPN    BP Pulse Temp Resp    112/76 (BP Location: Right arm, Patient Position: Sitting, BP Method: Manual) 61 96.9 °F (36.1 °C) (Tympanic) 14    Ht Wt BMI    5' 1" (1.549 m) 50.4 kg (111 lb 1.8 oz) 20.99 kg/m2      Blood Pressure          Most Recent Value    BP  112/76      Allergies as of 1/24/2017     Sulfa (Sulfonamide Antibiotics)      Immunizations Administered on Date of Encounter - 1/24/2017     None      Orders Placed During Today's Visit      Normal Orders This Visit    Ambulatory referral to Urology       [facility] Sign-Up     Activating your [facility] account is as easy as 1-2-3!     1) Visit [email or web address], select Sign Up Now, enter this activation code and your date of birth, then select Next.  [email or web address]  Expires: 2/17/2017 11:48 AM      2) Create a username and password to use when you visit [facility] in the future and select a security question in case you lose your password and select Next.    3) Enter your e-mail address and click Sign Up!    Additional Information  If you have questions, please e-mail [email or web address] or call [phone] to talk to our [facility] staff. Remember, [facility] is NOT to be used for urgent needs. For medical emergencies, dial 911.         "

## 2017-01-25 LAB — BACTERIA UR CULT: NO GROWTH

## 2017-02-07 ENCOUNTER — LAB VISIT (OUTPATIENT)
Dept: LAB | Facility: HOSPITAL | Age: 77
End: 2017-02-07
Attending: FAMILY MEDICINE
Payer: MEDICARE

## 2017-02-07 DIAGNOSIS — D64.9 MILD ANEMIA: ICD-10-CM

## 2017-02-07 DIAGNOSIS — R17 ELEVATED BILIRUBIN: ICD-10-CM

## 2017-02-07 LAB
ALBUMIN SERPL BCP-MCNC: 3.8 G/DL
ALP SERPL-CCNC: 152 U/L
ALT SERPL W/O P-5'-P-CCNC: 15 U/L
ANION GAP SERPL CALC-SCNC: 7 MMOL/L
AST SERPL-CCNC: 23 U/L
BASOPHILS # BLD AUTO: 0.03 K/UL
BASOPHILS NFR BLD: 0.4 %
BILIRUB SERPL-MCNC: 0.5 MG/DL
BUN SERPL-MCNC: 14 MG/DL
CALCIUM SERPL-MCNC: 10.1 MG/DL
CHLORIDE SERPL-SCNC: 105 MMOL/L
CO2 SERPL-SCNC: 28 MMOL/L
CREAT SERPL-MCNC: 0.8 MG/DL
DIFFERENTIAL METHOD: ABNORMAL
EOSINOPHIL # BLD AUTO: 0.1 K/UL
EOSINOPHIL NFR BLD: 0.9 %
ERYTHROCYTE [DISTWIDTH] IN BLOOD BY AUTOMATED COUNT: 13.5 %
EST. GFR  (AFRICAN AMERICAN): >60 ML/MIN/1.73 M^2
EST. GFR  (NON AFRICAN AMERICAN): >60 ML/MIN/1.73 M^2
GLUCOSE SERPL-MCNC: 106 MG/DL
HCT VFR BLD AUTO: 40 %
HGB BLD-MCNC: 12.6 G/DL
LYMPHOCYTES # BLD AUTO: 1.8 K/UL
LYMPHOCYTES NFR BLD: 23.4 %
MCH RBC QN AUTO: 30.1 PG
MCHC RBC AUTO-ENTMCNC: 31.5 %
MCV RBC AUTO: 96 FL
MONOCYTES # BLD AUTO: 0.6 K/UL
MONOCYTES NFR BLD: 7.2 %
NEUTROPHILS # BLD AUTO: 5.3 K/UL
NEUTROPHILS NFR BLD: 67.8 %
PLATELET # BLD AUTO: 311 K/UL
PMV BLD AUTO: 9.2 FL
POTASSIUM SERPL-SCNC: 4.7 MMOL/L
PROT SERPL-MCNC: 7.6 G/DL
RBC # BLD AUTO: 4.18 M/UL
SODIUM SERPL-SCNC: 140 MMOL/L
WBC # BLD AUTO: 7.82 K/UL

## 2017-02-07 PROCEDURE — 85025 COMPLETE CBC W/AUTO DIFF WBC: CPT

## 2017-02-07 PROCEDURE — 80053 COMPREHEN METABOLIC PANEL: CPT

## 2017-02-07 PROCEDURE — 36415 COLL VENOUS BLD VENIPUNCTURE: CPT | Mod: PO

## 2017-02-08 DIAGNOSIS — R74.8 ELEVATED ALKALINE PHOSPHATASE LEVEL: Primary | ICD-10-CM

## 2017-02-28 ENCOUNTER — TELEPHONE (OUTPATIENT)
Dept: INTERNAL MEDICINE | Facility: CLINIC | Age: 77
End: 2017-02-28

## 2017-02-28 NOTE — TELEPHONE ENCOUNTER
Kendra with [facility] states that pt gallbladder the gallbladder thickened on recent xray.. Also a pelvic mass. Kendra states that patient may need a Ultrasound. Please advise

## 2017-03-01 ENCOUNTER — TELEPHONE (OUTPATIENT)
Dept: INTERNAL MEDICINE | Facility: CLINIC | Age: 77
End: 2017-03-01

## 2017-03-01 NOTE — TELEPHONE ENCOUNTER
----- Message from Josey Zelaya sent at 3/1/2017  2:06 PM CST -----  Contact: pt   Pt needs to know if office received the CTscan  Results,,, please call pt back

## 2017-03-03 ENCOUNTER — TELEPHONE (OUTPATIENT)
Dept: INTERNAL MEDICINE | Facility: CLINIC | Age: 77
End: 2017-03-03

## 2017-03-03 NOTE — TELEPHONE ENCOUNTER
Reviewed CT scan of the abdomen with and without contrast done by  Cardiologist, showing   renal artery narrowing focally on the left causing 55% diameter reduction, no high grade stenosis of the renal artery to the right.   Narrowing of celiac axis origin by 40%  Atheromatous calcifications with dilation of the infrarenal aorta as described and at the diaphragm hiatus there is 3 mm ulceration  Left lower lobe scarring  Thickening of the wall of the gallbladder could be secondary to chronic cholecystitis this can be confirmed with an ultrasound if warranted  On the lowest images there is a presacral low-density mass, its entirety is not visualized.  Recommend CT of the pelvis with oral and IV contrast for further assessment  Diverticulosis    Please have patient make an appointment for further follow-up on this abnormal CT scan and needs further evaluation

## 2017-03-06 ENCOUNTER — OFFICE VISIT (OUTPATIENT)
Dept: INTERNAL MEDICINE | Facility: CLINIC | Age: 77
End: 2017-03-06
Payer: MEDICARE

## 2017-03-06 VITALS
DIASTOLIC BLOOD PRESSURE: 80 MMHG | OXYGEN SATURATION: 99 % | WEIGHT: 118.38 LBS | HEIGHT: 61 IN | HEART RATE: 77 BPM | BODY MASS INDEX: 22.35 KG/M2 | TEMPERATURE: 96 F | SYSTOLIC BLOOD PRESSURE: 130 MMHG

## 2017-03-06 DIAGNOSIS — Z85.3 HISTORY OF LEFT BREAST CANCER: ICD-10-CM

## 2017-03-06 DIAGNOSIS — K81.1 CHRONIC CHOLECYSTITIS: ICD-10-CM

## 2017-03-06 DIAGNOSIS — R74.8 ELEVATED ALKALINE PHOSPHATASE LEVEL: ICD-10-CM

## 2017-03-06 DIAGNOSIS — I25.810 CORONARY ARTERY DISEASE INVOLVING CORONARY BYPASS GRAFT OF NATIVE HEART WITHOUT ANGINA PECTORIS: ICD-10-CM

## 2017-03-06 DIAGNOSIS — M81.0 OSTEOPOROSIS: ICD-10-CM

## 2017-03-06 DIAGNOSIS — I10 ESSENTIAL HYPERTENSION: Chronic | ICD-10-CM

## 2017-03-06 DIAGNOSIS — R93.89 ABNORMAL FINDINGS ON IMAGING TEST: Primary | ICD-10-CM

## 2017-03-06 DIAGNOSIS — J30.2 SEASONAL ALLERGIC RHINITIS, UNSPECIFIED ALLERGIC RHINITIS TRIGGER: ICD-10-CM

## 2017-03-06 DIAGNOSIS — E78.2 MIXED HYPERLIPIDEMIA: ICD-10-CM

## 2017-03-06 DIAGNOSIS — Z12.31 ENCOUNTER FOR SCREENING MAMMOGRAM FOR MALIGNANT NEOPLASM OF BREAST: ICD-10-CM

## 2017-03-06 PROCEDURE — 99999 PR PBB SHADOW E&M-EST. PATIENT-LVL III: CPT | Mod: PBBFAC,,, | Performed by: FAMILY MEDICINE

## 2017-03-06 PROCEDURE — 99213 OFFICE O/P EST LOW 20 MIN: CPT | Mod: PBBFAC,PO | Performed by: FAMILY MEDICINE

## 2017-03-06 PROCEDURE — 99214 OFFICE O/P EST MOD 30 MIN: CPT | Mod: S$PBB,,, | Performed by: FAMILY MEDICINE

## 2017-03-06 RX ORDER — AMLODIPINE BESYLATE 5 MG/1
5 TABLET ORAL 2 TIMES DAILY
COMMUNITY

## 2017-03-06 RX ORDER — FLUTICASONE PROPIONATE 50 MCG
SPRAY, SUSPENSION (ML) NASAL
Qty: 16 G | Refills: 4 | Status: SHIPPED | OUTPATIENT
Start: 2017-03-06 | End: 2017-11-01 | Stop reason: SDUPTHER

## 2017-03-06 NOTE — PROGRESS NOTES
Subjective:       Patient ID: Aicha Rolle is a 76 y.o. female.    Chief Complaint: Follow-up    HPI Comments: 76-year-old female patient accompanied by her  with Patient Active Problem List:     Hypertension     Hyperlipidemia     Coronary artery disease     Osteoporosis     Diverticulosis of colon (without mention of hemorrhage)     History of left breast cancer  Here for follow-up on recent test results.  Patient had seen Dr. Cavazos cardiology, and had CT scan of the abdomen with and without contrast, showing thickening of the gallbladder consistent with chronic cholecystitis requiring ultrasound for further evaluation, and presacral mass density, advising to get CT scan of the pelvis with contrast for further evaluation.   Patient reported that she had a fall in December, and her pelvic area, and had to sit in the donut cushion.  Patient was followed by Dr. De Luna, orthopedic physician, and had physical therapy for the left elbow, not able to flex it completely, status post fall.   Patient denies of any chest pain or shortness of breath, has been taking her medications regularly.  Due for mammogram and DEXA scan, in April.  Patient had 3 falls last year, has been using cane, and denies of any recent falls.  Denies of any changes in weight, trouble with bowel movements or urine            Review of Systems   Constitutional: Negative for fatigue.   Eyes: Negative for visual disturbance.   Respiratory: Negative for shortness of breath.    Cardiovascular: Negative for chest pain and leg swelling.   Gastrointestinal: Negative for abdominal pain, constipation, nausea, rectal pain and vomiting.   Genitourinary: Negative for dysuria.   Musculoskeletal: Positive for myalgias.   Skin: Negative for rash.   Neurological: Negative for weakness, light-headedness, numbness and headaches.   Psychiatric/Behavioral: Negative for sleep disturbance.         /80  Pulse 77  Temp 96.1 °F (35.6 °C) (Tympanic)   Ht 5'  "1" (1.549 m)  Wt 53.7 kg (118 lb 6.2 oz)  SpO2 99%  BMI 22.37 kg/m2  Objective:      Physical Exam   Constitutional: She is oriented to person, place, and time. She appears well-developed and well-nourished.   HENT:   Head: Normocephalic and atraumatic.   Mouth/Throat: Oropharynx is clear and moist.   Cardiovascular: Normal rate, regular rhythm and normal heart sounds.    No murmur heard.  Pulmonary/Chest: Effort normal and breath sounds normal. She has no wheezes.   Abdominal: Soft. Bowel sounds are normal. She exhibits no distension and no mass. There is no tenderness. There is no guarding.   Musculoskeletal: She exhibits no edema or tenderness.   Neurological: She is alert and oriented to person, place, and time.   Skin: Skin is warm and dry.   Psychiatric: She has a normal mood and affect.         Assessment:       1. Abnormal findings on imaging test    2. Essential hypertension    3. Mixed hyperlipidemia    4. Coronary artery disease involving coronary bypass graft of native heart without angina pectoris    5. Osteoporosis    6. History of left breast cancer    7. Chronic cholecystitis    8. Encounter for screening mammogram for malignant neoplasm of breast    9. Seasonal allergic rhinitis, unspecified allergic rhinitis trigger    10. Elevated alkaline phosphatase level        Plan:   Abnormal findings on imaging test  -     CT Pelvis With Contrast; Future; Expected date: 3/6/17  -     Comprehensive metabolic panel; Future; Expected date: 3/6/17  As per my telephone notes, CT scan of the abdomen test results have been documented, sent by Dr. Cavazos's office.   Will get CT scan of the pelvis to look into presacral mass most likely related to recent fall, As informed by patient,  Clinically stable at this time  Advised to take fall precautions by using cane     Essential hypertension  -     Comprehensive metabolic panel; Future; Expected date: 3/6/17  -     Lipid panel; Future; Expected date: 3/6/17  -     " TSH; Future; Expected date: 3/6/17   blood pressure stable today currently on amlodipine 2.5 mg lisinopril 5 mg daily    Mixed hyperlipidemia-currently on Lipitor 80 mg     Coronary artery disease involving coronary bypass graft of native heart without angina pectoris   followed by Dr. Cavazos, cardiology  Clinically asymptomatic    Osteoporosis  -     DXA Bone Density Spine And Hip_Axial Skeleton; Future; Expected date: 4/28/17    History of left breast cancer-stable     Chronic cholecystitis  -     US Abdomen Complete; Future; Expected date: 3/6/17   Will check ultrasound of the abdomen to look into chronic cholecystitis, occasionally complains of minimal discomfort in the right upper abdomen but not regularly.   Encouraged to drink adequate fluids      Encounter for screening mammogram for malignant neoplasm of breast  -     Mammo Digital Screening Bilat with CAD; Future; Expected date: 4/28/17   due for mammogram and DEXA scan in April    Other orders  -     fluticasone (FLONASE) 50 mcg/actuation nasal spray; INSTILL 2 SPRAYS IN EACH NOSTRIL EVERY EVENING  Dispense: 16 g; Refill: 4  Refill request on Flonase today for seasonal ALLERGIES    Will continue to monitor elevated alkaline phosphatase levels

## 2017-03-06 NOTE — MR AVS SNAPSHOT
[facility] - [facility]  [address]  [place] [address]  Phone: [phone]  Fax: [phone]                  Aicha Rolle   3/6/2017 9:00 AM   Office Visit    Description:  Female : 1940   Provider:  Trish Hernandez MD   Department:  [facility] - Internal Medicine           Reason for Visit     Follow-up           Diagnoses this Visit        Comments    Abnormal findings on imaging test    -  Primary     Essential hypertension         Mixed hyperlipidemia         Coronary artery disease involving coronary bypass graft of native heart without angina pectoris         Osteoporosis         History of left breast cancer         Chronic cholecystitis         Encounter for screening mammogram for malignant neoplasm of breast                To Do List           Future Appointments        Provider Department Dept Phone    3/6/2017 1:05 PM LAB[facility]ATORY, [facility] [phone]    3/7/2017 10:30 AM [facility] US2 [facility] [phone]    3/7/2017 1:30 PM [facility] CT1 LIMIT 500 LBS [facility] [phone]    3/9/2017 10:20 AM LAB[facility]ATORY, [facility] [phone]    3/28/2017 10:15 AM [facility] [id number]-SCR [facility] [phone]      Goals (5 Years of Data)     None      Follow-Up and Disposition     Return in about 4 months (around 2017), or if symptoms worsen or fail to improve.       These Medications        Disp Refills Start End    fluticasone (FLONASE) 50 mcg/actuation nasal spray 16 g 4 3/6/2017     INSTILL 2 SPRAYS IN EACH NOSTRIL EVERY EVENING    Pharmacy: [id number] - [place] - [address] AT [place] [facility] Ph #: [phone]         [facility] On Call     [facility] On Call Nurse Care Line -  Assistance  Registered nurses in the [facility] Center provide clinical advisement, health education, appointment booking, and other advisory services.  Call for this free  "service at [phone].             Medications           Message regarding Medications     Verify the changes and/or additions to your medication regime listed below are the same as discussed with your clinician today.  If any of these changes or additions are incorrect, please notify your healthcare provider.             Verify that the below list of medications is an accurate representation of the medications you are currently taking.  If none reported, the list may be blank. If incorrect, please contact your healthcare provider. Carry this list with you in case of emergency.           Current Medications     amlodipine (NORVASC) 2.5 MG tablet Take 2.5 mg by mouth once daily.    atorvastatin (LIPITOR) 80 MG tablet Take 1 tablet (80 mg total) by mouth once daily.    biotin 1 mg tablet Take 1,000 mcg by mouth once daily.    CALCIUM CARBONATE/VITAMIN D3 (CALTRATE 600 + D ORAL) Take 1 tablet by mouth once daily.    carvedilol (COREG) 25 MG tablet Take 25 mg by mouth 2 (two) times daily with meals. Patient states taking 1/2 pill twice daily    clopidogrel (PLAVIX) 75 mg tablet Take 75 mg by mouth once daily.     cyanocobalamin (VITAMIN B-12) 250 MCG tablet Take 250 mcg by mouth once daily.    fluticasone (FLONASE) 50 mcg/actuation nasal spray INSTILL 2 SPRAYS IN EACH NOSTRIL EVERY EVENING    lisinopril (PRINIVIL,ZESTRIL) 5 MG tablet Take 5 mg by mouth once daily.    NITROSTAT 0.4 mg SL tablet            Clinical Reference Information           Your Vitals Were     BP Pulse Temp Height Weight SpO2    130/80 77 96.1 °F (35.6 °C) (Tympanic) 5' 1" (1.549 m) 53.7 kg (118 lb 6.2 oz) 99%    BMI                22.37 kg/m2          Blood Pressure          Most Recent Value    BP  130/80      Allergies as of 3/6/2017     Sulfa (Sulfonamide Antibiotics)      Immunizations Administered on Date of Encounter - 3/6/2017     None      Orders Placed During Today's Visit     Future Labs/Procedures Expected by Expires    " Comprehensive metabolic panel  3/6/2017 5/5/2018    CT Pelvis With Contrast  3/6/2017 3/6/2018    Lipid panel  3/6/2017 5/5/2018    TSH  3/6/2017 5/5/2018    US Abdomen Complete  3/6/2017 3/6/2018    DXA Bone Density Spine And Hip_Axial Skeleton  4/28/2017 3/6/2018    Mammo Digital Screening Bilat with CAD  4/28/2017 5/6/2018      [facility] Sign-Up     Activating your [facility] account is as easy as 1-2-3!     1) Visit [email or web address] [employer], select Sign Up Now, enter this activation code and your date of birth, then select Next.  [email or web address]  Expires: 4/20/2017  9:23 AM      2) Create a username and password to use when you visit [facility] in the future and select a security question in case you lose your password and select Next.    3) Enter your e-mail address and click Sign Up!    Additional Information  If you have questions, please e-mail [email or web address] or call [phone] to talk to our [facility] staff. Remember, [facility] is NOT to be used for urgent needs. For medical emergencies, dial 911.         Language Assistance Services     ATTENTION: Language assistance services are available, free of charge. Please call [phone].      ATENCIÓN: Si habla español, tiene a hurst disposición servicios gratuitos de asistencia lingüística. Llame al [phone].     CHÚ Ý: N?u b?n nói Ti?ng Vi?t, có các d?ch v? h? tr? ngôn ng? mi?n phí dành cho b?n. G?i s? [phone].         [facility] - [facility] complies with applicable Federal civil rights laws and does not discriminate on the basis of race, color, national origin, age, disability, or sex.

## 2017-03-07 ENCOUNTER — HOSPITAL ENCOUNTER (OUTPATIENT)
Dept: RADIOLOGY | Facility: HOSPITAL | Age: 77
Discharge: HOME | End: 2017-03-07
Attending: FAMILY MEDICINE
Payer: MEDICARE

## 2017-03-07 DIAGNOSIS — K81.1 CHRONIC CHOLECYSTITIS: ICD-10-CM

## 2017-03-07 DIAGNOSIS — R93.89 ABNORMAL FINDINGS ON IMAGING TEST: ICD-10-CM

## 2017-03-07 PROCEDURE — 72193 CT PELVIS W/DYE: CPT | Mod: 26,,, | Performed by: RADIOLOGY

## 2017-03-07 PROCEDURE — 76700 US EXAM ABDOM COMPLETE: CPT | Mod: 26,,, | Performed by: RADIOLOGY

## 2017-03-07 PROCEDURE — 72193 CT PELVIS W/DYE: CPT | Mod: TC,PO

## 2017-03-07 PROCEDURE — 25500020 PHARM REV CODE 255: Mod: PO | Performed by: FAMILY MEDICINE

## 2017-03-07 PROCEDURE — 76700 US EXAM ABDOM COMPLETE: CPT | Mod: TC,PO

## 2017-03-07 RX ADMIN — IOHEXOL 30 ML: 350 INJECTION, SOLUTION INTRAVENOUS at 11:03

## 2017-03-07 RX ADMIN — IOHEXOL 75 ML: 350 INJECTION, SOLUTION INTRAVENOUS at 12:03

## 2017-03-08 ENCOUNTER — TELEPHONE (OUTPATIENT)
Dept: INTERNAL MEDICINE | Facility: CLINIC | Age: 77
End: 2017-03-08

## 2017-03-08 DIAGNOSIS — R93.89 ABNORMAL FINDING ON CT SCAN: ICD-10-CM

## 2017-03-08 DIAGNOSIS — S32.040A COMPRESSION FRACTURE OF FOURTH LUMBAR VERTEBRA: Primary | ICD-10-CM

## 2017-03-08 NOTE — TELEPHONE ENCOUNTER
Reviewed CT scan with the patient today showing haziness in the pelvic area, cannot rule out tail gut cyst t versus lymphangioma  Also noted an for compression fracture, patient cannot get MRI secondary to metal in her ankle as well as elbow.  Patient clinically asymptomatic, has minimal low back pain  Reported that she had a fall in the past, and at that time x-rays did not show any fracture.   Will refer to Dr. Perera for further evaluation    Discussed about labs and ultrasound showing liver cyst.  No mention made about abnormality in the gallbladder

## 2017-03-28 ENCOUNTER — HOSPITAL ENCOUNTER (OUTPATIENT)
Dept: RADIOLOGY | Facility: HOSPITAL | Age: 77
Discharge: HOME | End: 2017-03-28
Attending: FAMILY MEDICINE
Payer: MEDICARE

## 2017-03-28 DIAGNOSIS — Z12.31 ENCOUNTER FOR SCREENING MAMMOGRAM FOR MALIGNANT NEOPLASM OF BREAST: ICD-10-CM

## 2017-04-18 ENCOUNTER — HOSPITAL ENCOUNTER (OUTPATIENT)
Dept: RADIOLOGY | Facility: HOSPITAL | Age: 77
Discharge: HOME | End: 2017-04-18
Attending: FAMILY MEDICINE
Payer: MEDICARE

## 2017-04-18 DIAGNOSIS — Z12.31 ENCOUNTER FOR SCREENING MAMMOGRAM FOR MALIGNANT NEOPLASM OF BREAST: ICD-10-CM

## 2017-04-18 PROCEDURE — 77067 SCR MAMMO BI INCL CAD: CPT | Mod: TC

## 2017-04-18 PROCEDURE — 77067 SCR MAMMO BI INCL CAD: CPT | Mod: 26,,, | Performed by: RADIOLOGY

## 2017-04-18 PROCEDURE — 77063 BREAST TOMOSYNTHESIS BI: CPT | Mod: 26,,, | Performed by: RADIOLOGY

## 2017-05-01 ENCOUNTER — APPOINTMENT (OUTPATIENT)
Dept: RADIOLOGY | Facility: CLINIC | Age: 77
End: 2017-05-01
Attending: FAMILY MEDICINE
Payer: MEDICARE

## 2017-05-01 DIAGNOSIS — M81.0 OSTEOPOROSIS: ICD-10-CM

## 2017-05-01 PROCEDURE — 77080 DXA BONE DENSITY AXIAL: CPT | Mod: TC,PO

## 2017-05-01 PROCEDURE — 77080 DXA BONE DENSITY AXIAL: CPT | Mod: 26,,, | Performed by: INTERNAL MEDICINE

## 2017-07-27 ENCOUNTER — HOSPITAL ENCOUNTER (OUTPATIENT)
Dept: RADIOLOGY | Facility: HOSPITAL | Age: 77
Discharge: HOME | End: 2017-07-27
Attending: FAMILY MEDICINE
Payer: MEDICARE

## 2017-07-27 ENCOUNTER — OFFICE VISIT (OUTPATIENT)
Dept: INTERNAL MEDICINE | Facility: CLINIC | Age: 77
End: 2017-07-27
Payer: MEDICARE

## 2017-07-27 VITALS
DIASTOLIC BLOOD PRESSURE: 80 MMHG | TEMPERATURE: 98 F | SYSTOLIC BLOOD PRESSURE: 130 MMHG | OXYGEN SATURATION: 99 % | HEART RATE: 78 BPM | BODY MASS INDEX: 22.89 KG/M2 | WEIGHT: 121.25 LBS | HEIGHT: 61 IN

## 2017-07-27 DIAGNOSIS — M81.0 OSTEOPOROSIS, UNSPECIFIED OSTEOPOROSIS TYPE, UNSPECIFIED PATHOLOGICAL FRACTURE PRESENCE: ICD-10-CM

## 2017-07-27 DIAGNOSIS — I25.810 CORONARY ARTERY DISEASE INVOLVING CORONARY BYPASS GRAFT OF NATIVE HEART WITHOUT ANGINA PECTORIS: ICD-10-CM

## 2017-07-27 DIAGNOSIS — E78.2 MIXED HYPERLIPIDEMIA: ICD-10-CM

## 2017-07-27 DIAGNOSIS — R06.09 DOE (DYSPNEA ON EXERTION): ICD-10-CM

## 2017-07-27 DIAGNOSIS — Z85.3 HISTORY OF LEFT BREAST CANCER: ICD-10-CM

## 2017-07-27 DIAGNOSIS — I10 ESSENTIAL HYPERTENSION: Chronic | ICD-10-CM

## 2017-07-27 DIAGNOSIS — R06.09 DOE (DYSPNEA ON EXERTION): Primary | ICD-10-CM

## 2017-07-27 PROCEDURE — 71020 XR CHEST PA AND LATERAL: CPT | Mod: TC,PO

## 2017-07-27 PROCEDURE — 99999 PR PBB SHADOW E&M-EST. PATIENT-LVL IV: CPT | Mod: PBBFAC,,, | Performed by: FAMILY MEDICINE

## 2017-07-27 PROCEDURE — 1159F MED LIST DOCD IN RCRD: CPT | Mod: ,,, | Performed by: FAMILY MEDICINE

## 2017-07-27 PROCEDURE — 99214 OFFICE O/P EST MOD 30 MIN: CPT | Mod: S$PBB,,, | Performed by: FAMILY MEDICINE

## 2017-07-27 PROCEDURE — 71020 XR CHEST PA AND LATERAL: CPT | Mod: 26,,, | Performed by: RADIOLOGY

## 2017-07-27 PROCEDURE — 1126F AMNT PAIN NOTED NONE PRSNT: CPT | Mod: ,,, | Performed by: FAMILY MEDICINE

## 2017-07-27 NOTE — PROGRESS NOTES
"Subjective:       Patient ID: Aicha Rolle is a 77 y.o. female.    Chief Complaint: Follow-up    77-year-old female patient with Patient Active Problem List:     Hypertension     Hyperlipidemia     Coronary artery disease     Osteoporosis     Diverticulosis of colon (without mention of hemorrhage)     History of left breast cancer  Here with complaint of shortness of breath with minimal exertion off and on for the past 2-3 months, patient has been followed by her cardiologist Dr. Cavazos and has been evaluated closely.  Patient was walking 2 miles daily but has cut down for the past 1 year, has started back walking again.  Denies of any chest pain or shortness of breath, does not need any further refills.   Denies of any other complaints today  No leg swelling reported        Review of Systems   Constitutional: Negative for fatigue.   Eyes: Negative for visual disturbance.   Respiratory: Positive for shortness of breath.         With exertion   Cardiovascular: Negative for chest pain and leg swelling.   Gastrointestinal: Negative for abdominal pain, nausea and vomiting.   Musculoskeletal: Negative for myalgias.   Skin: Negative for rash.   Neurological: Negative for light-headedness and headaches.   Psychiatric/Behavioral: Negative for sleep disturbance.         /80   Pulse 78   Temp 98 °F (36.7 °C) (Tympanic)   Ht 5' 1" (1.549 m)   Wt 55 kg (121 lb 4.1 oz)   SpO2 99%   BMI 22.91 kg/m²   Objective:      Physical Exam   Constitutional: She is oriented to person, place, and time. She appears well-developed and well-nourished.   HENT:   Head: Normocephalic and atraumatic.   Mouth/Throat: Oropharynx is clear and moist.   Cardiovascular: Normal rate, regular rhythm and normal heart sounds.    No murmur heard.  Pulmonary/Chest: Effort normal and breath sounds normal. No respiratory distress. She has no wheezes.   Abdominal: Soft. Bowel sounds are normal. There is no tenderness.   Musculoskeletal: She " exhibits no edema.   Neurological: She is alert and oriented to person, place, and time.   Skin: Skin is warm and dry. No rash noted.   Psychiatric: She has a normal mood and affect.         Assessment:       1. IBARRA (dyspnea on exertion)    2. Essential hypertension    3. Mixed hyperlipidemia    4. Coronary artery disease involving coronary bypass graft of native heart without angina pectoris    5. Osteoporosis, unspecified osteoporosis type, unspecified pathological fracture presence    6. History of left breast cancer        Plan:   IBARRA (dyspnea on exertion)  -     Brain natriuretic peptide; Future; Expected date: 07/27/2017  -     X-Ray Chest PA And Lateral; Future; Expected date: 07/27/2017  -     Cancel: EKG 12-lead; Future  Patient reports that she has been getting serial EKGs by her cardiologist office, and does not want to do EKG today.  Encouraged to restrict salt intake and to graded exercise regimen.       Essential hypertension  -     Comprehensive metabolic panel; Future; Expected date: 07/27/2017  -     Lipid panel; Future; Expected date: 07/27/2017  -     TSH; Future; Expected date: 07/27/2017  Blood pressure stable today currently on amlodipine 2.5 mg , carvedilol 25 mg twice daily and lisinopril 5 mg daily    Mixed hyperlipidemia  -     Lipid panel; Future; Expected date: 07/27/2017  Currently taking Lipitor 80 mg daily    Coronary artery disease involving coronary bypass graft of native heart without angina pectoris-followed by Dr. Cavazos    Osteoporosis, unspecified osteoporosis type, unspecified pathological fracture presence  -     Ambulatory Referral to Rheumatology  Patient would like to consider biphosphonate's versus injectables.   Currently taking calcium with vitamin D supplements    History of left breast cancer-stable

## 2017-10-26 ENCOUNTER — CLINICAL SUPPORT (OUTPATIENT)
Dept: INTERNAL MEDICINE | Facility: CLINIC | Age: 77
End: 2017-10-26
Payer: MEDICARE

## 2017-10-26 DIAGNOSIS — Z23 NEED FOR VACCINATION: Primary | ICD-10-CM

## 2017-10-26 PROCEDURE — 99212 OFFICE O/P EST SF 10 MIN: CPT | Mod: PBBFAC,PO

## 2017-10-26 PROCEDURE — G0008 ADMIN INFLUENZA VIRUS VAC: HCPCS | Mod: PBBFAC,PO

## 2017-10-26 PROCEDURE — 99999 PR PBB SHADOW E&M-EST. PATIENT-LVL II: CPT | Mod: PBBFAC,,,

## 2017-10-26 NOTE — PROGRESS NOTES
Fluzone High dose administered.  See immunization record.  Pt advised to wait in clinic 15 minutes to monitor for side effects.  Pt voice understanding and tolerated injection well.

## 2017-11-01 ENCOUNTER — TELEPHONE (OUTPATIENT)
Dept: INTERNAL MEDICINE | Facility: CLINIC | Age: 77
End: 2017-11-01

## 2017-11-01 ENCOUNTER — OFFICE VISIT (OUTPATIENT)
Dept: INTERNAL MEDICINE | Facility: CLINIC | Age: 77
End: 2017-11-01
Payer: MEDICARE

## 2017-11-01 VITALS
WEIGHT: 117.75 LBS | BODY MASS INDEX: 22.23 KG/M2 | SYSTOLIC BLOOD PRESSURE: 124 MMHG | HEIGHT: 61 IN | TEMPERATURE: 99 F | DIASTOLIC BLOOD PRESSURE: 78 MMHG

## 2017-11-01 DIAGNOSIS — J30.1 CHRONIC SEASONAL ALLERGIC RHINITIS DUE TO POLLEN: ICD-10-CM

## 2017-11-01 DIAGNOSIS — I25.10 CORONARY ARTERY DISEASE INVOLVING NATIVE CORONARY ARTERY OF NATIVE HEART WITHOUT ANGINA PECTORIS: ICD-10-CM

## 2017-11-01 DIAGNOSIS — Z85.3 HISTORY OF LEFT BREAST CANCER: ICD-10-CM

## 2017-11-01 DIAGNOSIS — F41.1 GAD (GENERALIZED ANXIETY DISORDER): Primary | ICD-10-CM

## 2017-11-01 DIAGNOSIS — I10 ESSENTIAL HYPERTENSION: Chronic | ICD-10-CM

## 2017-11-01 DIAGNOSIS — Z98.61 S/P PTCA (PERCUTANEOUS TRANSLUMINAL CORONARY ANGIOPLASTY): ICD-10-CM

## 2017-11-01 PROCEDURE — 99213 OFFICE O/P EST LOW 20 MIN: CPT | Mod: PBBFAC,PO | Performed by: FAMILY MEDICINE

## 2017-11-01 PROCEDURE — 99214 OFFICE O/P EST MOD 30 MIN: CPT | Mod: S$PBB,,, | Performed by: FAMILY MEDICINE

## 2017-11-01 PROCEDURE — 99999 PR PBB SHADOW E&M-EST. PATIENT-LVL III: CPT | Mod: PBBFAC,,, | Performed by: FAMILY MEDICINE

## 2017-11-01 RX ORDER — BUPROPION HYDROCHLORIDE 100 MG/1
100 TABLET, EXTENDED RELEASE ORAL 2 TIMES DAILY
Qty: 60 TABLET | Refills: 3 | Status: SHIPPED | OUTPATIENT
Start: 2017-11-01 | End: 2017-12-15

## 2017-11-01 RX ORDER — FLUTICASONE PROPIONATE 50 MCG
SPRAY, SUSPENSION (ML) NASAL
Qty: 16 G | Refills: 4 | Status: SHIPPED | OUTPATIENT
Start: 2017-11-01

## 2017-11-01 NOTE — TELEPHONE ENCOUNTER
Spoke to pt. Informed pt of recommendations. Pt is scheduled to see dr negrete today 11/1/17. Pt verbalized understanding

## 2017-11-01 NOTE — TELEPHONE ENCOUNTER
----- Message from Trish Hernandez MD sent at 10/31/2017  4:39 PM CDT -----  Contact: patient  Please advise patient to come in to discuss further about her anxiety    Dr. Hernandez  ----- Message -----  From: Amairani Cameron LPN  Sent: 10/31/2017   3:36 PM  To: Trish Hernandez MD        ----- Message -----  From: Misa Diaz  Sent: 10/31/2017   3:26 PM  To: [facility] Staff    Calling concerning needing something for her nerves. States her  is in the hospital and she is nervous and when she gets nervous her BP goes up. Please call patient ASAP today URGENT!! @ [phone]. Thanks, [facility] [phone].

## 2017-11-01 NOTE — PROGRESS NOTES
"Subjective:       Patient ID: Aicha Rolle is a 77 y.o. female.    Chief Complaint: Anxiety    77-year-old female patient with Patient Active Problem List:     Hypertension     Hyperlipidemia     Coronary artery disease     Osteoporosis     Diverticulosis of colon (without mention of hemorrhage)     History of left breast cancer  Here reports that she has been anxious lately secondary to her 's health, her   has been in the hospital for the past 1 month, and has been sick.  Patient reports that she's been having anxiety off and on lately but got worse for the past 1 month, patient has been able to sleep well but reports severe anxiety associated with mild depression.   Occasionally has been having palpitations due to anxiety.   Taking her medications regularly  Denies of any chest pain or difficulty breathing  Denies of any suicidal or homicidal thoughts        Review of Systems   Constitutional: Negative for fatigue.   Eyes: Negative for visual disturbance.   Respiratory: Negative for shortness of breath.    Cardiovascular: Positive for palpitations. Negative for chest pain and leg swelling.   Gastrointestinal: Negative for abdominal pain, nausea and vomiting.   Musculoskeletal: Negative for myalgias.   Skin: Negative for rash.   Neurological: Negative for light-headedness and headaches.   Psychiatric/Behavioral: Positive for dysphoric mood. Negative for sleep disturbance. The patient is nervous/anxious.          /78   Temp 98.9 °F (37.2 °C) (Tympanic)   Ht 5' 1" (1.549 m)   Wt 53.4 kg (117 lb 11.6 oz)   BMI 22.24 kg/m²   Objective:      Physical Exam   Constitutional: She is oriented to person, place, and time. She appears well-developed and well-nourished.   HENT:   Head: Normocephalic and atraumatic.   Mouth/Throat: Oropharynx is clear and moist.   Cardiovascular: Normal rate, regular rhythm and normal heart sounds.    No murmur heard.  Pulmonary/Chest: Effort normal and breath sounds " normal. She has no wheezes.   Abdominal: Soft. Bowel sounds are normal. There is no tenderness.   Musculoskeletal: She exhibits no edema.   Neurological: She is alert and oriented to person, place, and time.   Skin: Skin is warm and dry. No rash noted.   Psychiatric: She has a normal mood and affect.         Assessment:       1. DORIS (generalized anxiety disorder)    2. Essential hypertension    3. Coronary artery disease involving native coronary artery of native heart without angina pectoris    4. S/P PTCA (percutaneous transluminal coronary angioplasty)    5. History of left breast cancer    6. Chronic seasonal allergic rhinitis due to pollen        Plan:   DORIS (generalized anxiety disorder)  -     buPROPion (WELLBUTRIN SR) 100 MG TBSR 12 hr tablet; Take 1 tablet (100 mg total) by mouth 2 (two) times daily.  Dispense: 60 tablet; Refill: 3  Will start on Wellbutrin, patient would like to start taking 1 tablet daily for a week and then increase it to twice a day, follow-up with me in 4 weeks.   If having any side effects taking this medication patient was advised to call us back sooner    Essential hypertension-blood pressure stable today currently on amlodipine 2.5 mg and carvedilol 25 mg twice daily    Coronary artery disease involving native coronary artery of native heart without angina pectoris  S/P PTCA (percutaneous transluminal coronary angioplasty)  Followed by cardiology Dr. Cavazos.  Stable    History of left breast cancer-stable    Chronic seasonal allergic rhinitis due to pollen  -     fluticasone (FLONASE) 50 mcg/actuation nasal spray; INSTILL 2 SPRAYS IN EACH NOSTRIL EVERY EVENING  Dispense: 16 g; Refill: 4  Refill request on Flonase today, has been doing well

## 2017-12-11 ENCOUNTER — OFFICE VISIT (OUTPATIENT)
Dept: INTERNAL MEDICINE | Facility: CLINIC | Age: 77
End: 2017-12-11
Payer: MEDICARE

## 2017-12-11 ENCOUNTER — LAB VISIT (OUTPATIENT)
Dept: LAB | Facility: HOSPITAL | Age: 77
End: 2017-12-11
Attending: FAMILY MEDICINE
Payer: MEDICARE

## 2017-12-11 VITALS
SYSTOLIC BLOOD PRESSURE: 122 MMHG | BODY MASS INDEX: 23.55 KG/M2 | WEIGHT: 124.75 LBS | TEMPERATURE: 99 F | OXYGEN SATURATION: 99 % | HEART RATE: 82 BPM | DIASTOLIC BLOOD PRESSURE: 80 MMHG | HEIGHT: 61 IN

## 2017-12-11 DIAGNOSIS — I25.10 CORONARY ARTERY DISEASE INVOLVING NATIVE CORONARY ARTERY OF NATIVE HEART WITHOUT ANGINA PECTORIS: ICD-10-CM

## 2017-12-11 DIAGNOSIS — M81.0 OSTEOPOROSIS, UNSPECIFIED OSTEOPOROSIS TYPE, UNSPECIFIED PATHOLOGICAL FRACTURE PRESENCE: ICD-10-CM

## 2017-12-11 DIAGNOSIS — M43.9 COMPRESSION DEFORMITY OF VERTEBRA: ICD-10-CM

## 2017-12-11 DIAGNOSIS — I10 ESSENTIAL HYPERTENSION: Chronic | ICD-10-CM

## 2017-12-11 DIAGNOSIS — F41.1 GAD (GENERALIZED ANXIETY DISORDER): Primary | ICD-10-CM

## 2017-12-11 LAB
CREAT SERPL-MCNC: 0.7 MG/DL
EST. GFR  (AFRICAN AMERICAN): >60 ML/MIN/1.73 M^2
EST. GFR  (NON AFRICAN AMERICAN): >60 ML/MIN/1.73 M^2

## 2017-12-11 PROCEDURE — 82565 ASSAY OF CREATININE: CPT

## 2017-12-11 PROCEDURE — 99213 OFFICE O/P EST LOW 20 MIN: CPT | Mod: PBBFAC,PO | Performed by: FAMILY MEDICINE

## 2017-12-11 PROCEDURE — 99214 OFFICE O/P EST MOD 30 MIN: CPT | Mod: S$PBB,,, | Performed by: FAMILY MEDICINE

## 2017-12-11 PROCEDURE — 36415 COLL VENOUS BLD VENIPUNCTURE: CPT | Mod: PO

## 2017-12-11 PROCEDURE — 99999 PR PBB SHADOW E&M-EST. PATIENT-LVL III: CPT | Mod: PBBFAC,,, | Performed by: FAMILY MEDICINE

## 2017-12-11 NOTE — PROGRESS NOTES
"Subjective:       Patient ID: Aicha Rolle is a 77 y.o. female.    Chief Complaint: Follow-up    77-year-old female patient with Patient Active Problem List:     Hypertension     Hyperlipidemia     Coronary artery disease     Osteoporosis     Diverticulosis of colon (without mention of hemorrhage)     History of left breast cancer  Here for follow-up on anxiety, has been doing well taking Wellbutrin 100 mg daily, was initially prescribed twice daily but has been doing well with once a day dosing.   Patient has not been exercising lately.  Denies of any other complaints today, has been getting her medications by Dr. Maza, cardiologist  Patient reports that she has an appointment with rheumatology on Friday for osteoporosis, currently taking calcium with vitamin D supplements, does not want to take any biphophanates  Patient reports that she had CT scan of the pelvis done in March, showing compression fracture for L4, patient reported having 3 falls last year.   Has no problems with imbalance lately  Denies of any significant back pain       Review of Systems   Constitutional: Negative for fatigue.   Eyes: Negative for visual disturbance.   Respiratory: Negative for shortness of breath.    Cardiovascular: Negative for chest pain and leg swelling.   Gastrointestinal: Negative for abdominal pain, nausea and vomiting.   Musculoskeletal: Negative for back pain, gait problem and myalgias.   Skin: Negative for rash.   Neurological: Negative for light-headedness and headaches.   Psychiatric/Behavioral: Negative for dysphoric mood and sleep disturbance. The patient is not nervous/anxious.          /80 (BP Location: Right arm, Patient Position: Sitting)   Pulse 82   Temp 98.7 °F (37.1 °C) (Tympanic)   Ht 5' 1" (1.549 m)   Wt 56.6 kg (124 lb 12.5 oz)   SpO2 99%   BMI 23.58 kg/m²   Objective:      Physical Exam   Constitutional: She is oriented to person, place, and time. She appears well-developed and " well-nourished.   HENT:   Head: Normocephalic and atraumatic.   Mouth/Throat: Oropharynx is clear and moist.   Cardiovascular: Normal rate, regular rhythm and normal heart sounds.    No murmur heard.  Pulmonary/Chest: Effort normal and breath sounds normal. She has no wheezes.   Abdominal: Soft. Bowel sounds are normal. There is no tenderness.   Musculoskeletal: She exhibits no edema or tenderness.   Neurological: She is alert and oriented to person, place, and time.   Skin: Skin is warm and dry. No rash noted.   Psychiatric: She has a normal mood and affect.         Assessment:       1. DORIS (generalized anxiety disorder)    2. Essential hypertension    3. Osteoporosis, unspecified osteoporosis type, unspecified pathological fracture presence    4. Coronary artery disease involving native coronary artery of native heart without angina pectoris    5. Compression deformity of vertebra        Plan:   DORIS (generalized anxiety disorder)- stable on Wellbutrin 100 mg daily.  Continue to take once daily.   Avoid stress    Essential hypertension-blood pressure stable today currently on amlodipine 2.5 mg, followed by cardiology    Osteoporosis, unspecified osteoporosis type, unspecified pathological fracture presence-currently taking calcium with vitamin D supplements over-the-counter, has appointment with rheumatology to discuss further    Coronary artery disease involving native coronary artery of native heart without angina pectoris-stable and asymptomatic on amlodipine and carvedilol    Compression deformity of vertebra  -     MRI Lumbar Spine W WO Contrast; Future; Expected date: 12/11/2017  -     Creatinine, serum; Future; Expected date: 12/11/2017  Reviewed CT scan of the pelvis done in March 2017 showing    1.  Indeterminate low density lesion in the presacral space measuring up to 5.6 cm which is not demonstrate simple fluid attenuation.  Differential considerations would include congenital and developmental  anomalies such as tail gut cyst and lymphangioma.  Cystic neoplasm not entirely excluded although no definite aggressive features are demonstrated on this exam.  Contrast enhanced MRI would likely be useful for further characterization.  2.  Age-indeterminate L4 compression fracture.  Further characterization could be obtained with MRI as clinically warranted.  Will plan to get MRI of the lumbar spine to look into further etiology, patient clinically asymptomatic.

## 2017-12-14 ENCOUNTER — TELEPHONE (OUTPATIENT)
Dept: RHEUMATOLOGY | Facility: CLINIC | Age: 77
End: 2017-12-14

## 2017-12-15 ENCOUNTER — INITIAL CONSULT (OUTPATIENT)
Dept: RHEUMATOLOGY | Facility: CLINIC | Age: 77
End: 2017-12-15
Payer: MEDICARE

## 2017-12-15 VITALS
HEART RATE: 96 BPM | BODY MASS INDEX: 22.87 KG/M2 | DIASTOLIC BLOOD PRESSURE: 93 MMHG | SYSTOLIC BLOOD PRESSURE: 157 MMHG | WEIGHT: 121.06 LBS

## 2017-12-15 DIAGNOSIS — M81.0 POST-MENOPAUSAL OSTEOPOROSIS: Primary | ICD-10-CM

## 2017-12-15 PROCEDURE — 99999 PR PBB SHADOW E&M-EST. PATIENT-LVL III: CPT | Mod: PBBFAC,,, | Performed by: INTERNAL MEDICINE

## 2017-12-15 PROCEDURE — 99204 OFFICE O/P NEW MOD 45 MIN: CPT | Mod: S$PBB,,, | Performed by: INTERNAL MEDICINE

## 2017-12-15 PROCEDURE — 99213 OFFICE O/P EST LOW 20 MIN: CPT | Mod: PBBFAC,PO | Performed by: INTERNAL MEDICINE

## 2017-12-15 RX ORDER — CALCITONIN SALMON 200 [IU]/.09ML
1 SPRAY, METERED NASAL DAILY
Qty: 1 BOTTLE | Refills: 3 | Status: SHIPPED | OUTPATIENT
Start: 2017-12-15 | End: 2018-06-11

## 2017-12-15 NOTE — ASSESSMENT & PLAN NOTE
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 No GERD, No history of Bisphosphonate use. She is scheduled to undergo multiple dental works in the next month which makes it not ideal to start bisphosphonate - fosamax now. She has history of Coronary artery disease contraindicating use of evista. While waiting for her to complete dental procedures, I will start her on calcitonin nasal spray and start fosamax once she is cleared by her dentist. Risks vs Benefits and potential side effects of medication prescribed today were discussed with patient. Medication literature given to patient on discharge.  We also discussed about jaw necrosis and atypical fractures which are rare side effects from the medication.

## 2017-12-15 NOTE — LETTER
December 15, 2017      Trish Hernandez MD  [address] [place] [address]           [facility] - Rheumatology  [address] [place] [address]  Phone: [phone]  Fax: [phone]          Patient: Aicha Rolle   MR Number: [id number]   YOB: 1940   Date of Visit: 12/15/2017       Dear Dr. Trish Hernandez:    Thank you for referring Aicha Rolle to me for evaluation. Attached you will find relevant portions of my assessment and plan of care.    If you have questions, please do not hesitate to call me. I look forward to following Aicha Rolle along with you.    Sincerely,    Will Del Castillo MD    Enclosure  CC:  No Recipients    If you would like to receive this communication electronically, please contact [email or web address] or [phone] to request more information on [facility] access.    For providers and/or their staff who would like to refer a patient to [facility], please contact us through our one-stop-shop provider referral line, [facility] , at [phone].    If you feel you have received this communication in error or would no longer like to receive these types of communications, please e-mail [email or web address]

## 2017-12-15 NOTE — PATIENT INSTRUCTIONS
Calcitonin nasal spray  What is this medicine?  CALCITONIN (georgina si TOE garrett) stops bone loss and breaks. It will make your bones more dense, or thicker. This medicine is used to treat postmenopausal osteoporosis.  How should I use this medicine?  This medicine is only for use in the nose. Do not take by mouth. Follow the directions on your prescription label. Alternate nostrils daily. Do not use more often than directed. Make sure that you are using your nasal spray correctly. Ask you doctor or health care provider if you have any questions.  Talk to your pediatrician regarding the use of this medicine in children. Special care may be needed.  What side effects may I notice from receiving this medicine?  Side effects that you should report to your doctor or health care professional as soon as possible:  · allergic reactions like skin rash, itching or hives, swelling of the face, lips, or tongue  · breathing problems  · chest pain, tightness  · dizziness  · infection or fever  · nosebleed or nose sores  · tingling in the hands or feet  Side effects that usually do not require medical attention (report to your doctor or health care professional if they continue or are bothersome):  · back or joint pain  · flushing  · headache  · nausea or upset stomach  · runny, stuffy, or irritated nose  · tremors  · watery eyes  What may interact with this medicine?  · prior bone scan with diphosphonate in patient with Paget's disease  What if I miss a dose?  If you miss a dose, use it as soon as you can. If it is almost time for your next dose, use only that dose. Do not use double or extra doses.  Where should I keep my medicine?  Keep out of the reach of children.  Store the unopened medicine in a refrigerator between 2 and 8 degrees C (36 and 46 degrees F). Do not freeze. Once opened store at room temperature between 15 and 30 degrees C (59 and 86 degrees F) in an upright position for up to 35 days. Throw away any unused  medicine after the expiration date.  What should I tell my health care provider before I take this medicine?  They need to know if you have any of these conditions:  · nasal sores or trauma  · an unusual or allergic reaction to calcitonin, fish, other medicines, foods, dyes, or preservatives  · pregnant or trying to get pregnant  · breast-feeding  What should I watch for while using this medicine?  Visit your doctor or health care professional for regular checks on your progress. Your doctor will need to exam the inside of your nose before you start using the medicine and periodically during use.  Talk to your doctor about your risk of cancer. You may be more at risk for certain types of cancers if you take this medicine.  Be sure that you get at least 1000 mg of calcium and 400 I.U. of vitamin D while you are taking this medicine. This will help the medicine work better. Ask your doctor or health care provider for good nutrition advice.  NOTE:This sheet is a summary. It may not cover all possible information. If you have questions about this medicine, talk to your doctor, pharmacist, or health care provider. Copyright© 2017 [employer]

## 2017-12-15 NOTE — PROGRESS NOTES
RHEUMATOLOGY CLINIC INITIAL VISIT    Reason for referral:-  Referred by Dr. Hernandez for evaluation of osteoporosis.     Chief complaints:-  To discuss osteoporosis treatment.     HPI:-  Aicha Jiang a 77 y.o. pleasant female comes in for an initial visit with above chief complaints. She has longstanding history of osteopenia and suffered fragility fractures over wrist and ankle in the past. She denied taking fosamax because of the potential side effects. The recent DEXA showed osteoporosis and she was referred to start treatment. No GERD, No history of Bisphosphonate use. She is scheduled to undergo multiple dental works in the next month. She denies any joint pain today.     Review of Systems   Constitutional: Negative for chills and fever.   HENT: Negative for congestion and sore throat.    Eyes: Negative for blurred vision and redness.   Respiratory: Negative for cough and shortness of breath.    Cardiovascular: Negative for chest pain and leg swelling.   Gastrointestinal: Negative for abdominal pain.   Genitourinary: Negative for dysuria.   Musculoskeletal: Negative for back pain, falls, joint pain, myalgias and neck pain.   Skin: Negative for rash.   Neurological: Negative for headaches.   Endo/Heme/Allergies: Does not bruise/bleed easily.   Psychiatric/Behavioral: Negative for memory loss. The patient does not have insomnia.        Past Medical History:   Diagnosis Date    Coronary artery disease     s/p cardiac stents Dr Cavazos- [facility]    Diverticulosis of colon (without mention of hemorrhage) 11/13/2014    History of left breast cancer     Left Breast Cancer    Hyperlipidemia     Hypertension     Osteoporosis        Past Surgical History:   Procedure Laterality Date    ABDOMINAL SURGERY      flap    BREAST RECONSTRUCTION      reconstruction    CORONARY ANGIOPLASTY WITH STENT PLACEMENT      ELBOW SURGERY Left 10/24/2016    CECILIA Fenton     left ankle surgery      MASTECTOMY      left breast     PARTIAL HYSTERECTOMY      TONSILLECTOMY          Social History   Substance Use Topics    Smoking status: Never Smoker    Smokeless tobacco: Never Used    Alcohol use 0.6 oz/week     1 Glasses of wine per week       Family History   Problem Relation Age of Onset    Cancer Mother      breast    Heart disease Father     Heart disease Son     Hyperlipidemia Son     Heart disease Son     Hyperlipidemia Son        Review of patient's allergies indicates:   Allergen Reactions    Sulfa (sulfonamide antibiotics) Itching           Physical examination:-    Vitals:    12/15/17 1002   BP: (!) 157/93   Pulse: 96   Weight: 54.9 kg (121 lb 0.5 oz)   PainSc: 0-No pain       Physical Exam   Constitutional: She is oriented to person, place, and time and well-developed, well-nourished, and in no distress. No distress.   HENT:   Head: Normocephalic.   Mouth/Throat: Oropharynx is clear and moist.   Eyes: Conjunctivae and EOM are normal. Pupils are equal, round, and reactive to light.   Neck: Normal range of motion. Neck supple.   Cardiovascular: Normal rate and intact distal pulses.    Pulmonary/Chest: Effort normal. No respiratory distress.   Abdominal: Soft. There is no tenderness.   Musculoskeletal:   No synovitis over small joints of hands or feet.  No effusion over large joints.   Neurological: She is alert and oriented to person, place, and time. No cranial nerve deficit.   Skin: Skin is warm. No rash noted. No erythema.   Psychiatric: Mood and affect normal.   Nursing note and vitals reviewed.      Labs:-  Results for LESVIA VILLALOBOS (MRN [id number]) as of 12/15/2017 11:37   Ref. Range 12/11/2017 10:05   Creatinine Latest Ref Range: 0.5 - 1.4 mg/dL 0.7   eGFR if non African American Latest Ref Range: >60 mL/min/1.73 m^2 >60.0   eGFR if African American Latest Ref Range: >60 mL/min/1.73 m^2 >60.0     Radiographs:-  Independent visualization of images done.     Old and Outside medical records :-  Reviewed old and all outside  medical records available in Care Everywhere.     Medication List with Changes/Refills   New Medications    CALCITONIN, SALMON, (FORTICAL) 200 UNIT/ACTUATION NASAL SPRAY    1 spray by Nasal route once daily.   Current Medications    AMLODIPINE (NORVASC) 2.5 MG TABLET    Take 2.5 mg by mouth once daily.    ATORVASTATIN (LIPITOR) 80 MG TABLET    Take 1 tablet (80 mg total) by mouth once daily.    BIOTIN 1 MG TABLET    Take 1,000 mcg by mouth once daily.    CALCIUM CARBONATE/VITAMIN D3 (CALTRATE 600 + D ORAL)    Take 1 tablet by mouth once daily.    CARVEDILOL (COREG) 25 MG TABLET    Take 25 mg by mouth 2 (two) times daily with meals. Patient states taking 1/2 pill twice daily    CLOPIDOGREL (PLAVIX) 75 MG TABLET    Take 75 mg by mouth once daily.     FLUTICASONE (FLONASE) 50 MCG/ACTUATION NASAL SPRAY    INSTILL 2 SPRAYS IN EACH NOSTRIL EVERY EVENING    NITROSTAT 0.4 MG SL TABLET       Discontinued Medications    BUPROPION (WELLBUTRIN SR) 100 MG TBSR 12 HR TABLET    Take 1 tablet (100 mg total) by mouth 2 (two) times daily.       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 No GERD, No history of Bisphosphonate use. She is scheduled to undergo multiple dental works in the next month which makes it not ideal to start bisphosphonate - fosamax now. She has history of Coronary artery disease contraindicating use of evista. While waiting for her to complete dental procedures, I will start her on calcitonin nasal spray and start fosamax once she is cleared by her dentist. Risks vs Benefits and potential side effects of medication prescribed today were discussed with patient. Medication literature given to patient on discharge.  We also discussed about jaw necrosis and atypical fractures which are rare side effects from the medication.   - calcitonin, salmon, (FORTICAL) 200 unit/actuation nasal spray; 1 spray by Nasal route once daily.  Dispense: 1  Bottle; Refill: 3     # RTC in 1 year. Patient instructed to contact me 4 weeks after completing dental procedure so that I could stop calcitonin and send fosamax.     Thank you Dr. Hernandez  for allowing me to participate in the care ofAicha Rolle.    Time spent: 60 minutes in face to face discussion concerning diagnosis, prognosis, review of lab and test results, benefits of treatment as well as management of disease, counseling of patient and coordination of care between various health care providers . Greater than half of the time spent - 35 minutes was used for coordination of care and counseling of patient.    Disclaimer: This note was prepared using voice recognition system and is likely to have sound alike errors and is not proof read.  Please call me with any questions.

## 2017-12-20 ENCOUNTER — TELEPHONE (OUTPATIENT)
Dept: RADIOLOGY | Facility: HOSPITAL | Age: 77
End: 2017-12-20

## 2017-12-21 ENCOUNTER — HOSPITAL ENCOUNTER (OUTPATIENT)
Dept: RADIOLOGY | Facility: HOSPITAL | Age: 77
Discharge: HOME | End: 2017-12-21
Attending: FAMILY MEDICINE
Payer: MEDICARE

## 2017-12-21 ENCOUNTER — TELEPHONE (OUTPATIENT)
Dept: INTERNAL MEDICINE | Facility: CLINIC | Age: 77
End: 2017-12-21

## 2017-12-21 DIAGNOSIS — Z87.81 HISTORY OF VERTEBRAL COMPRESSION FRACTURE: Primary | ICD-10-CM

## 2017-12-21 DIAGNOSIS — M47.816 SPONDYLOSIS OF LUMBAR SPINE: ICD-10-CM

## 2017-12-21 DIAGNOSIS — M43.9 COMPRESSION DEFORMITY OF VERTEBRA: ICD-10-CM

## 2017-12-21 PROCEDURE — 72158 MRI LUMBAR SPINE W/O & W/DYE: CPT | Mod: TC,PO

## 2017-12-21 PROCEDURE — A9585 GADOBUTROL INJECTION: HCPCS | Mod: PO | Performed by: FAMILY MEDICINE

## 2017-12-21 PROCEDURE — 25500020 PHARM REV CODE 255: Mod: PO | Performed by: FAMILY MEDICINE

## 2017-12-21 PROCEDURE — 72158 MRI LUMBAR SPINE W/O & W/DYE: CPT | Mod: 26,,, | Performed by: RADIOLOGY

## 2017-12-21 RX ORDER — GADOBUTROL 604.72 MG/ML
5 INJECTION INTRAVENOUS
Status: COMPLETED | OUTPATIENT
Start: 2017-12-21 | End: 2017-12-21

## 2017-12-21 RX ADMIN — GADOBUTROL 5 ML: 604.72 INJECTION INTRAVENOUS at 08:12

## 2017-12-21 NOTE — TELEPHONE ENCOUNTER
MRI of the lumbar spine showing L4 old compression fracture, bulging disc to the lower back and arthritis, though patient clinically does not have any significant back pain at this time will refer to spine specialist for further evaluation.

## 2017-12-22 ENCOUNTER — TELEPHONE (OUTPATIENT)
Dept: INTERNAL MEDICINE | Facility: CLINIC | Age: 77
End: 2017-12-22

## 2017-12-22 NOTE — TELEPHONE ENCOUNTER
----- Message from Marissa Nazario sent at 12/22/2017  3:04 PM CST -----  Patient states that she is returning your call regarding her MRI results.   Call her at [phone] or [phone].                                be

## 2017-12-22 NOTE — TELEPHONE ENCOUNTER
Notified pt that MRI of the lumbar spine showed an old L4 compression fracture, bulging disc to the lower back, and arthritis, and though she does not have any significant back pain at this time, will refer to spine specialist for further evaluation.  Pt verbalized understanding and scheduled appt with Dr. Royal.

## 2017-12-27 ENCOUNTER — TELEPHONE (OUTPATIENT)
Dept: RHEUMATOLOGY | Facility: CLINIC | Age: 77
End: 2017-12-27

## 2017-12-27 DIAGNOSIS — J34.89 NASAL SORE: ICD-10-CM

## 2017-12-27 DIAGNOSIS — J34.89 NASAL SORE: Primary | ICD-10-CM

## 2017-12-27 RX ORDER — MUPIROCIN 20 MG/G
OINTMENT TOPICAL 2 TIMES DAILY
Qty: 30 G | Refills: 1 | Status: SHIPPED | OUTPATIENT
Start: 2017-12-27 | End: 2018-06-11

## 2017-12-27 RX ORDER — MUPIROCIN 20 MG/G
OINTMENT TOPICAL 2 TIMES DAILY
Qty: 30 G | Refills: 1 | Status: SHIPPED | OUTPATIENT
Start: 2017-12-27 | End: 2017-12-27 | Stop reason: SDUPTHER

## 2017-12-27 NOTE — TELEPHONE ENCOUNTER
Tell her to hold her nasal spray until her sore heals  I sent her some Bactroban ointment to the [facility]    she can apply this with Q tip to the nasal sore twice a day until it resolves    If just routine dental work then ok to resume her spray once sore heals

## 2017-12-27 NOTE — TELEPHONE ENCOUNTER
----- Message from Justino Sandoval LPN sent at 12/15/2017 10:38 AM CST -----  Check about dentist

## 2017-12-27 NOTE — TELEPHONE ENCOUNTER
Spoke with pt and she is set to begin dental work on 1.4.18. Pt states that she is doing the calcitonin nasal spray but has a sore inside her nose. Denies any discharge from it and describes it was a little scab. Pt would like to know if she should continue spray with this or wait until it is resolved. Please Advise.

## 2018-01-16 ENCOUNTER — TELEPHONE (OUTPATIENT)
Dept: INTERNAL MEDICINE | Facility: CLINIC | Age: 78
End: 2018-01-16

## 2018-01-16 NOTE — TELEPHONE ENCOUNTER
----- Message from Won Mantilla sent at 1/16/2018 10:30 AM CST -----  Contact: Dr Fischer Office  Caller request a call from the nurse to get a copy of pt most recent labs fax to [phone], please contact the caller at [phone]

## 2018-01-25 ENCOUNTER — OFFICE VISIT (OUTPATIENT)
Dept: PAIN MEDICINE | Facility: CLINIC | Age: 78
End: 2018-01-25
Payer: MEDICARE

## 2018-01-25 VITALS
HEIGHT: 61 IN | HEART RATE: 80 BPM | DIASTOLIC BLOOD PRESSURE: 79 MMHG | SYSTOLIC BLOOD PRESSURE: 139 MMHG | RESPIRATION RATE: 16 BRPM | WEIGHT: 121 LBS | BODY MASS INDEX: 22.84 KG/M2

## 2018-01-25 DIAGNOSIS — M47.816 LUMBAR SPONDYLOSIS: Primary | ICD-10-CM

## 2018-01-25 DIAGNOSIS — M51.36 DDD (DEGENERATIVE DISC DISEASE), LUMBAR: ICD-10-CM

## 2018-01-25 DIAGNOSIS — M54.16 LUMBAR RADICULOPATHY: ICD-10-CM

## 2018-01-25 DIAGNOSIS — S32.040A CLOSED COMPRESSION FRACTURE OF FOURTH LUMBAR VERTEBRA, INITIAL ENCOUNTER: ICD-10-CM

## 2018-01-25 PROCEDURE — 99999 PR PBB SHADOW E&M-EST. PATIENT-LVL II: CPT | Mod: PBBFAC,,, | Performed by: ANESTHESIOLOGY

## 2018-01-25 PROCEDURE — 99214 OFFICE O/P EST MOD 30 MIN: CPT | Mod: S$PBB,,, | Performed by: ANESTHESIOLOGY

## 2018-01-25 PROCEDURE — 99212 OFFICE O/P EST SF 10 MIN: CPT | Mod: PBBFAC,PO | Performed by: ANESTHESIOLOGY

## 2018-01-25 NOTE — PROGRESS NOTES
Chief Pain Complaint:  Left Iliac crest pain     History of Present Illness:   Aicha Rolle is a 77 y.o. female  who is presenting with a chief complaint of Left Iliac crest pain. The patient began experiencing this problem insidiously, and the pain has been improving over the past 3 month(s). The pain is described as throbbing, cramping, aching and heavy and is located in the Left Iliac crest. Pain is intermittent and lasts hours. The pain is nonradiating. The patient rates her pain a 5 out of ten and interferes with activities of daily living a 5 out of ten. Pain is exacerbated by nothing specific, and is improved by rest. Patient reports prior trauma, no prior spinal surgery     - pertinent negatives: No fever, No chills, No weight loss, No bladder dysfunction, No bowel dysfunction, No extremity weakness, No saddle anesthesia  - pertinent positives: none    - medications, other therapies tried (physical therapy, injections):     >> NSAIDs and Tylenol    >> Has previously undergone Physical Therapy    >> Has NOT previously undergone spinal injection/s      Imaging / Labs / Studies (reviewed on 1/25/2018):    No results found for this or any previous visit.  Results for orders placed during the hospital encounter of 12/21/17   MRI Lumbar Spine W WO Contrast    Narrative MRI lumbar spine with and without contrast    Technique: Multiplanar multisequence MRI of the lumbar spine was performed before and after administering 5 cc of gadolinium just    Findings: The visualized distal spinal cord appears normal. The tip of the conus medullaris terminating at L1/L2 level.    Incidentally noted is mild aortic ectasia. 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    There is old compression fracture deformity involving the superior endplate of L4 vertebral body with fatty replacement of the superior aspect of the vertebral body noted. No bone marrow edema. Osseous structures demonstrate normal signal intensity without evidence of acute  fracture or listhesis. Post contrast menstruation, there are no abnormally enhancing areas seen.    L1-L2: Unremarkable  L2-L3: Mild broad-based disc bulge and facet and ligamentum flavum hypertrophy. No significant spinal canal or neural foraminal narrowing.  L3-L4: No spinal canal or neuroforaminal narrowing. There is mild disc bulge and facet hypertrophy.  L4-L5: There is a small annular tear seen at this level. There is facet and ligamentum flavum hypertrophy with posterior disc bulge. There is mild relative narrowing of the spinal canal. No neural foraminal stenosis.  L5-S1: There is a small annular tear with signal alteration in the disc space. There is no neural foraminal stenosis. There is posterior disc bulge with effacement of the thecal sac.    Impression  Old compression fracture deformity of the L4 vertebral body. Mild relative narrowing of the spinal canal at L4-L5 secondary to disc bulge and facet and ligamentum flavum hypertrophy. No significant areas of neural foraminal stenosis. Additional findings as discussed above.      Electronically signed by: Mateus Ramirez  Date:     12/21/17  Time:    09:21        Review of Systems:  CONSTITUTIONAL: patient denies any fever, chills, or weight loss  SKIN: patient denies any rash or itching  RESPIRATORY: patient denies having any shortness of breath  GASTROINTESTINAL: patient denies having any diarrhea, constipation, or bowel incontinence  GENITOURINARY: patient denies having any abnormal bladder function    MUSCULOSKELETAL:  - patient complains of the above noted pain/s (see chief pain complaint)    NEUROLOGICAL:   - pain as above  - strength in Lower extremities is intact, BILATERALLY  - sensation in Lower extremities is intact, BILATERALLY  - patient denies any loss of bowel or bladder control      PSYCHIATRIC: patient denies any change in mood    Other:  All other systems reviewed and are negative      Physical Exam:  /79 (BP Location: Right arm,  "Patient Position: Sitting)   Pulse 80   Resp 16   Ht 5' 1" (1.549 m)   Wt 54.9 kg (121 lb)   BMI 22.86 kg/m²  (reviewed on 1/25/2018)  General: Alert and oriented, in no apparent distress.  Gait: normal gait.  Skin: No rashes, No discoloration, No obvious lesions  HEENT: Normocephalic, atraumatic. Pupils equal and round.  Cardiovascular: Regular rate and rhythm , no significant peripheral edema present  Respiratory: Without audible wheezing, without use of accessory muscles of respiration.    Musculoskeletal:      Lumbar Spine    - Pain on flexion of lumbar spine Absent  - Straight Leg Raise:  Absent    - Pain on extension of lumbar spine Absent  - TTP over the lumbar facet joints Absent  - Lumbar facet loading Absent    -Pain on palpation over the SI joint  Absent  - MAHENDRA: Absent    -No TTP over the L4 vertebrae     -TTP over the left iliac Creast    Neuro:    Strength:  LE R/L: HF: 5/5, HE: 5/5, KF: 5/5; KE: 5/5; FE: 5/5; FF: 5/5    Extremity Reflexes: Brisk and symmetric throughout.      Extremity Sensory: Sensation to pinprick and temperature symmetric. Proprioception intact.      Psych:  Mood and affect is appropriate      Assessment:    Aicha Rolle is a 77 y.o. year old female who is presenting with     Encounter Diagnoses   Name Primary?    Lumbar spondylosis Yes    Lumbar radiculopathy     DDD (degenerative disc disease), lumbar     Closed compression fracture of fourth lumbar vertebra, initial encounter        Plan:    1. Interventional: None for now. Consider Left L4-5 TFESI if Radiculopathy symptoms return. No need for intervention for the L4 compression fracture as it is chronic as evidenced by no edema and not symptomatic.      2. Pharmacologic: None for now.    3. Rehabilitative: Encouraged Pt. Avoid excessive flexion of the lumbar spine.     4. Diagnostic: None for now.    5. Follow up: PRN    30 minutes were spent in this encounter with more than 50% of the time used for counseling and " review of the plan.  Imaging / studies reviewed, detailed above.  I discussed in detail the risks, benefits, and alternatives to any and all potential treatment options.  All questions and concerns were fully addressed today in clinic. Medical decision making moderate.    Thank you for the opportunity to assist in the care of this patient.    Best wishes,    Signed:    Wade Royal MD          Disclaimer:  This note may have been prepared using voice recognition software, it may have not been extensively proofed, as such there could be errors within the text such as sound alike errors.

## 2018-01-25 NOTE — LETTER
January 25, 2018      Trish Hernandez MD  [address] [place] [address]           [facility]  [address] [place] [address]  Phone: [phone]  Fax: [phone]          Patient: Aicha Rolle   MR Number: [id number]   YOB: 1940   Date of Visit: 1/25/2018       Dear Dr. Trish Hernandez:    Thank you for referring Aicha Rolle to me for evaluation. Attached you will find relevant portions of my assessment and plan of care.    If you have questions, please do not hesitate to call me. I look forward to following Aicha Rolle along with you.    Sincerely,    Wade Royal MD    Enclosure  CC:  No Recipients    If you would like to receive this communication electronically, please contact [email or web address] or [phone] to request more information on [employer] access.    For providers and/or their staff who would like to refer a patient to [facility], please contact us through our one-stop-shop provider referral line, [facility] , at [phone].    If you feel you have received this communication in error or would no longer like to receive these types of communications, please e-mail [email or web address]

## 2018-06-11 ENCOUNTER — LAB VISIT (OUTPATIENT)
Dept: LAB | Facility: HOSPITAL | Age: 78
End: 2018-06-11
Attending: FAMILY MEDICINE
Payer: MEDICARE

## 2018-06-11 ENCOUNTER — OFFICE VISIT (OUTPATIENT)
Dept: INTERNAL MEDICINE | Facility: CLINIC | Age: 78
End: 2018-06-11
Payer: MEDICARE

## 2018-06-11 VITALS
SYSTOLIC BLOOD PRESSURE: 136 MMHG | DIASTOLIC BLOOD PRESSURE: 72 MMHG | TEMPERATURE: 97 F | HEART RATE: 78 BPM | HEIGHT: 61 IN | OXYGEN SATURATION: 97 % | WEIGHT: 121.5 LBS | BODY MASS INDEX: 22.94 KG/M2

## 2018-06-11 DIAGNOSIS — M81.0 AGE-RELATED OSTEOPOROSIS WITHOUT CURRENT PATHOLOGICAL FRACTURE: ICD-10-CM

## 2018-06-11 DIAGNOSIS — E78.2 MIXED HYPERLIPIDEMIA: ICD-10-CM

## 2018-06-11 DIAGNOSIS — I10 ESSENTIAL HYPERTENSION: Chronic | ICD-10-CM

## 2018-06-11 DIAGNOSIS — M43.9 COMPRESSION DEFORMITY OF VERTEBRA: ICD-10-CM

## 2018-06-11 DIAGNOSIS — I10 ESSENTIAL HYPERTENSION: Primary | Chronic | ICD-10-CM

## 2018-06-11 DIAGNOSIS — I70.0 ATHEROSCLEROSIS OF ABDOMINAL AORTA: ICD-10-CM

## 2018-06-11 DIAGNOSIS — I70.1 LEFT RENAL ARTERY STENOSIS: ICD-10-CM

## 2018-06-11 DIAGNOSIS — Z85.3 HISTORY OF LEFT BREAST CANCER: ICD-10-CM

## 2018-06-11 DIAGNOSIS — I25.10 CORONARY ARTERY DISEASE INVOLVING NATIVE CORONARY ARTERY OF NATIVE HEART WITHOUT ANGINA PECTORIS: ICD-10-CM

## 2018-06-11 LAB
ALBUMIN SERPL BCP-MCNC: 4.1 G/DL
ALP SERPL-CCNC: 63 U/L
ALT SERPL W/O P-5'-P-CCNC: 19 U/L
ANION GAP SERPL CALC-SCNC: 9 MMOL/L
AST SERPL-CCNC: 26 U/L
BILIRUB SERPL-MCNC: 0.8 MG/DL
BUN SERPL-MCNC: 14 MG/DL
CALCIUM SERPL-MCNC: 10.5 MG/DL
CHLORIDE SERPL-SCNC: 104 MMOL/L
CHOLEST SERPL-MCNC: 153 MG/DL
CHOLEST/HDLC SERPL: 3.3 {RATIO}
CO2 SERPL-SCNC: 28 MMOL/L
CREAT SERPL-MCNC: 0.8 MG/DL
EST. GFR  (AFRICAN AMERICAN): >60 ML/MIN/1.73 M^2
EST. GFR  (NON AFRICAN AMERICAN): >60 ML/MIN/1.73 M^2
GLUCOSE SERPL-MCNC: 112 MG/DL
HDLC SERPL-MCNC: 47 MG/DL
HDLC SERPL: 30.7 %
LDLC SERPL CALC-MCNC: 90 MG/DL
NONHDLC SERPL-MCNC: 106 MG/DL
POTASSIUM SERPL-SCNC: 4.7 MMOL/L
PROT SERPL-MCNC: 7.6 G/DL
SODIUM SERPL-SCNC: 141 MMOL/L
TRIGL SERPL-MCNC: 80 MG/DL
TSH SERPL DL<=0.005 MIU/L-ACNC: 1.53 UIU/ML

## 2018-06-11 PROCEDURE — 80061 LIPID PANEL: CPT

## 2018-06-11 PROCEDURE — 36415 COLL VENOUS BLD VENIPUNCTURE: CPT | Mod: PO

## 2018-06-11 PROCEDURE — 80053 COMPREHEN METABOLIC PANEL: CPT

## 2018-06-11 PROCEDURE — 99213 OFFICE O/P EST LOW 20 MIN: CPT | Mod: PBBFAC,PO | Performed by: FAMILY MEDICINE

## 2018-06-11 PROCEDURE — 99214 OFFICE O/P EST MOD 30 MIN: CPT | Mod: S$PBB,,, | Performed by: FAMILY MEDICINE

## 2018-06-11 PROCEDURE — 84443 ASSAY THYROID STIM HORMONE: CPT

## 2018-06-11 PROCEDURE — 99999 PR PBB SHADOW E&M-EST. PATIENT-LVL III: CPT | Mod: PBBFAC,,, | Performed by: FAMILY MEDICINE

## 2018-06-11 RX ORDER — CLONIDINE 0.1 MG/24H
1 PATCH, EXTENDED RELEASE TRANSDERMAL WEEKLY
COMMUNITY

## 2018-06-11 RX ORDER — CARVEDILOL 25 MG/1
25 TABLET ORAL 2 TIMES DAILY WITH MEALS
COMMUNITY
Start: 2018-06-06 | End: 2021-04-06 | Stop reason: ALTCHOICE

## 2018-06-11 NOTE — PROGRESS NOTES
"Subjective:       Patient ID: Aicha Rolle is a 78 y.o. female.    Chief Complaint: Follow-up    78-year-old female patient with Patient Active Problem List:     Hypertension     Hyperlipidemia     Coronary artery disease     Osteoporosis     Diverticulosis of colon (without mention of hemorrhage)     History of left breast cancer     Left renal artery stenosis     Atherosclerosis of abdominal aorta     Compression deformity of vertebra  Here for follow-up on chronic medical conditions.  Patient has been followed by Dr rosa , cardiology and has been checked on coronary artery disease status post stent and left renal artery stenosis, blood pressure has been stable.   Patient has been taking her medications regularly  Denies any chest pain or shortness of breath, abdominal discomfort nausea vomiting.   Patient due for diagnostic mammogram  Reports minimal back pain but has been stable taking Tylenol as needed  Reports that [facility]ness but has been doing well      Review of Systems   Constitutional: Negative for fatigue.   Eyes: Negative for visual disturbance.   Respiratory: Negative for shortness of breath.    Cardiovascular: Negative for chest pain and leg swelling.   Gastrointestinal: Negative for abdominal pain, nausea and vomiting.   Musculoskeletal: Positive for back pain and myalgias.   Skin: Negative for rash.   Neurological: Negative for weakness, light-headedness, numbness and headaches.   Psychiatric/Behavioral: Negative for sleep disturbance.         /72   Pulse 78   Temp 97 °F (36.1 °C) (Tympanic)   Ht 5' 1" (1.549 m)   Wt 55.1 kg (121 lb 7.6 oz)   SpO2 97%   BMI 22.95 kg/m²   Objective:      Physical Exam   Constitutional: She is oriented to person, place, and time. She appears well-developed and well-nourished.   HENT:   Head: Normocephalic and atraumatic.   Mouth/Throat: Oropharynx is clear and moist.   Cardiovascular: Normal rate, " regular rhythm and normal heart sounds.    No murmur heard.  Pulmonary/Chest: Effort normal and breath sounds normal. She has no wheezes.   Abdominal: Soft. Bowel sounds are normal. There is no tenderness.   Musculoskeletal: She exhibits no edema or tenderness.   Neurological: She is alert and oriented to person, place, and time.   Skin: Skin is warm and dry. No rash noted.   Psychiatric: She has a normal mood and affect.         Assessment:       1. Essential hypertension    2. Mixed hyperlipidemia    3. History of left breast cancer    4. Coronary artery disease involving native coronary artery of native heart without angina pectoris    5. Age-related osteoporosis without current pathological fracture    6. Atherosclerosis of abdominal aorta    7. Compression deformity of vertebra    8. Left renal artery stenosis        Plan:   Essential hypertension  -     Comprehensive metabolic panel; Future; Expected date: 06/11/2018  -     Lipid panel; Future; Expected date: 06/11/2018  -     TSH; Future; Expected date: 06/11/2018  Blood pressure is stable today currently on amlodipine 2.5 mg clonidine 0.1 mg patch and carvedilol 25 mg twice daily  Restrict salt intake and eat low-fat and low-cholesterol diet    Mixed hyperlipidemia  -     Lipid panel; Future; Expected date: 06/11/2018  Currently taking Lipitor 80 mg daily  Will check fasting labs today    History of left breast cancer  -     Mammo Digital Diagnostic Bilateral; Future; Expected date: 06/11/2018  Do for diagnostic mammogram    Coronary artery disease involving native coronary artery of native heart without angina pectoris  Left renal artery stenosis  Atherosclerosis of abdominal aorta  Followed by Cardiology Dr. Maza , patient clinically doing well and asymptomatic  Advised to discuss further repeat testing by Cardiology if needed secondary to left renal artery stenosis    Age-related osteoporosis without current pathological fracture  Compression  deformity of vertebra  Stable currently taking calcium with vitamin-D supplements over-the-counter and taking Tylenol as needed for back pain

## 2018-07-17 ENCOUNTER — HOSPITAL ENCOUNTER (OUTPATIENT)
Dept: RADIOLOGY | Facility: HOSPITAL | Age: 78
Discharge: HOME | End: 2018-07-17
Attending: FAMILY MEDICINE
Payer: MEDICARE

## 2018-07-17 DIAGNOSIS — Z12.31 SCREENING MAMMOGRAM, ENCOUNTER FOR: ICD-10-CM

## 2018-07-17 DIAGNOSIS — Z85.3 HISTORY OF LEFT BREAST CANCER: ICD-10-CM

## 2018-07-17 PROCEDURE — 77067 SCR MAMMO BI INCL CAD: CPT | Mod: 26,,, | Performed by: RADIOLOGY

## 2018-07-17 PROCEDURE — 77063 BREAST TOMOSYNTHESIS BI: CPT | Mod: 26,,, | Performed by: RADIOLOGY

## 2018-07-17 PROCEDURE — 77067 SCR MAMMO BI INCL CAD: CPT | Mod: TC,PO

## 2018-07-25 ENCOUNTER — HOSPITAL ENCOUNTER (OUTPATIENT)
Dept: RADIOLOGY | Facility: HOSPITAL | Age: 78
Discharge: HOME | End: 2018-07-25
Attending: FAMILY MEDICINE
Payer: MEDICARE

## 2018-07-25 DIAGNOSIS — R92.8 ABNORMAL MAMMOGRAM: ICD-10-CM

## 2018-07-25 PROCEDURE — 76642 ULTRASOUND BREAST LIMITED: CPT | Mod: 26,RT,, | Performed by: RADIOLOGY

## 2018-07-25 PROCEDURE — 76642 ULTRASOUND BREAST LIMITED: CPT | Mod: TC,PO,RT

## 2018-07-26 ENCOUNTER — TELEPHONE (OUTPATIENT)
Dept: RADIOLOGY | Facility: HOSPITAL | Age: 78
End: 2018-07-26

## 2018-10-02 ENCOUNTER — IMMUNIZATION (OUTPATIENT)
Dept: INTERNAL MEDICINE | Facility: CLINIC | Age: 78
End: 2018-10-02
Payer: MEDICARE

## 2018-10-02 PROCEDURE — 99999 PR PBB SHADOW E&M-EST. PATIENT-LVL II: CPT | Mod: PBBFAC,,,

## 2018-10-02 PROCEDURE — 90662 IIV NO PRSV INCREASED AG IM: CPT | Mod: PBBFAC,PO

## 2018-10-02 PROCEDURE — 99212 OFFICE O/P EST SF 10 MIN: CPT | Mod: PBBFAC,PO,25

## 2018-10-02 NOTE — PROGRESS NOTES
Fluzone high dose administered.  See immunization record.  Pt advised to wait in  Clinic 15 minutes to monitor for side effects.  Pt voiced understanding and tolerated injection well.

## 2018-11-09 ENCOUNTER — OFFICE VISIT (OUTPATIENT)
Dept: INTERNAL MEDICINE | Facility: CLINIC | Age: 78
End: 2018-11-09
Payer: MEDICARE

## 2018-11-09 ENCOUNTER — HOSPITAL ENCOUNTER (OUTPATIENT)
Dept: RADIOLOGY | Facility: HOSPITAL | Age: 78
Discharge: HOME | End: 2018-11-09
Attending: FAMILY MEDICINE
Payer: MEDICARE

## 2018-11-09 ENCOUNTER — TELEPHONE (OUTPATIENT)
Dept: INTERNAL MEDICINE | Facility: CLINIC | Age: 78
End: 2018-11-09

## 2018-11-09 VITALS
OXYGEN SATURATION: 95 % | HEART RATE: 74 BPM | HEIGHT: 61 IN | TEMPERATURE: 98 F | DIASTOLIC BLOOD PRESSURE: 86 MMHG | BODY MASS INDEX: 23.43 KG/M2 | WEIGHT: 124.13 LBS | SYSTOLIC BLOOD PRESSURE: 124 MMHG

## 2018-11-09 DIAGNOSIS — M21.962 FOOT DEFORMITY, BILATERAL: ICD-10-CM

## 2018-11-09 DIAGNOSIS — M43.9 COMPRESSION DEFORMITY OF VERTEBRA: ICD-10-CM

## 2018-11-09 DIAGNOSIS — M21.961 FOOT DEFORMITY, BILATERAL: Primary | ICD-10-CM

## 2018-11-09 DIAGNOSIS — M21.42 PES PLANUS OF BOTH FEET: Primary | ICD-10-CM

## 2018-11-09 DIAGNOSIS — M20.10 VALGUS DEFORMITY OF GREAT TOE, UNSPECIFIED LATERALITY: ICD-10-CM

## 2018-11-09 DIAGNOSIS — M21.961 FOOT DEFORMITY, BILATERAL: ICD-10-CM

## 2018-11-09 DIAGNOSIS — I25.10 CORONARY ARTERY DISEASE INVOLVING NATIVE CORONARY ARTERY OF NATIVE HEART WITHOUT ANGINA PECTORIS: ICD-10-CM

## 2018-11-09 DIAGNOSIS — M21.41 PES PLANUS OF BOTH FEET: Primary | ICD-10-CM

## 2018-11-09 DIAGNOSIS — I10 ESSENTIAL HYPERTENSION: Chronic | ICD-10-CM

## 2018-11-09 DIAGNOSIS — I70.0 ATHEROSCLEROSIS OF ABDOMINAL AORTA: ICD-10-CM

## 2018-11-09 DIAGNOSIS — M81.0 AGE-RELATED OSTEOPOROSIS WITHOUT CURRENT PATHOLOGICAL FRACTURE: ICD-10-CM

## 2018-11-09 DIAGNOSIS — Z85.3 HISTORY OF LEFT BREAST CANCER: ICD-10-CM

## 2018-11-09 DIAGNOSIS — M21.962 FOOT DEFORMITY, BILATERAL: Primary | ICD-10-CM

## 2018-11-09 DIAGNOSIS — E78.2 MIXED HYPERLIPIDEMIA: ICD-10-CM

## 2018-11-09 PROCEDURE — 73630 X-RAY EXAM OF FOOT: CPT | Mod: 26,50,, | Performed by: RADIOLOGY

## 2018-11-09 PROCEDURE — 99214 OFFICE O/P EST MOD 30 MIN: CPT | Mod: S$PBB,,, | Performed by: FAMILY MEDICINE

## 2018-11-09 PROCEDURE — 99213 OFFICE O/P EST LOW 20 MIN: CPT | Mod: PBBFAC,25,PO | Performed by: FAMILY MEDICINE

## 2018-11-09 PROCEDURE — 73630 X-RAY EXAM OF FOOT: CPT | Mod: 50,TC,FY,PO

## 2018-11-09 PROCEDURE — 99999 PR PBB SHADOW E&M-EST. PATIENT-LVL III: CPT | Mod: PBBFAC,,, | Performed by: FAMILY MEDICINE

## 2018-11-09 RX ORDER — BIMATOPROST 0.3 MG/ML
1 SOLUTION/ DROPS OPHTHALMIC
COMMUNITY

## 2018-11-09 RX ORDER — GLUCOSAMINE HCL 500 MG
1 TABLET ORAL
COMMUNITY

## 2018-11-09 RX ORDER — ALENDRONATE SODIUM 70 MG/1
70 TABLET ORAL
Qty: 4 TABLET | Refills: 11 | Status: SHIPPED | OUTPATIENT
Start: 2018-11-09 | End: 2020-10-06

## 2018-11-09 NOTE — TELEPHONE ENCOUNTER
X-ray of the bilateral feet showing bony spurs, arthritis changes and prominent bunion on the left with flat feet bilaterally, will refer to Podiatry for further evaluation

## 2018-11-09 NOTE — PROGRESS NOTES
"Subjective:       Patient ID: Aicha Rolle is a 78 y.o. female.    Chief Complaint: podiatry referral;    78-year-old female patient with Patient Active Problem List:     Hypertension     Hyperlipidemia     Coronary artery disease     Osteoporosis     Diverticulosis of colon (without mention of hemorrhage)     History of left breast cancer     Left renal artery stenosis     Atherosclerosis of abdominal aorta     Compression deformity of vertebra  Reports that she started noticing excess bone growth to the right foot medially and also has minimal excess growth to the left foot but denies any significant pain, patient has been wearing flip-flops regularly.   Would like to get checked out to see whether she needs a referral to Podiatry  Patient secondary to compression deformity of the lumbar vertebrae reports minimal back pain and with osteoporosis would like to get started on Fosamax  No recent falls reported  Denies any chest pain or difficulty breathing, palpitations  Patient has been followed by Dr Maza, cardiologist and was noted that she has left renal artery stenosis but could not find any imaging studies showing it  Patient does have atherosclerosis with ectasia of abdominal aorta      Review of Systems   Constitutional: Negative for fatigue.   Eyes: Negative for visual disturbance.   Respiratory: Negative for shortness of breath.    Cardiovascular: Negative for chest pain and leg swelling.   Gastrointestinal: Negative for abdominal pain, nausea and vomiting.   Musculoskeletal: Positive for back pain and myalgias. Negative for arthralgias and gait problem.   Skin: Negative for rash.   Neurological: Negative for weakness, light-headedness, numbness and headaches.   Psychiatric/Behavioral: Negative for sleep disturbance.         /86 (BP Location: Right arm, Patient Position: Sitting)   Pulse 74   Temp 98.1 °F (36.7 °C) (Tympanic)   Ht 5' 1" (1.549 m)   Wt 56.3 kg (124 lb 1.9 oz)   SpO2 95%   BMI " 23.45 kg/m²   Objective:      Physical Exam   Constitutional: She is oriented to person, place, and time. She appears well-developed and well-nourished.   HENT:   Head: Normocephalic and atraumatic.   Mouth/Throat: Oropharynx is clear and moist.   Cardiovascular: Normal rate, regular rhythm and normal heart sounds.   No murmur heard.  Pulmonary/Chest: Effort normal and breath sounds normal. She has no wheezes.   Abdominal: Soft. Bowel sounds are normal. There is no tenderness.   Musculoskeletal: She exhibits no edema or tenderness.   Noted bony projection to the bilateral feet medially but more on the right side   Neurological: She is alert and oriented to person, place, and time.   Skin: Skin is warm and dry. No rash noted. No erythema.   Psychiatric: She has a normal mood and affect.         Assessment:       1. Foot deformity, bilateral    2. Essential hypertension    3. Compression deformity of vertebra    4. Atherosclerosis of abdominal aorta    5. Age-related osteoporosis without current pathological fracture    6. Coronary artery disease involving native coronary artery of native heart without angina pectoris    7. History of left breast cancer    8. Mixed hyperlipidemia        Plan:   Foot deformity, bilateral  -     Cancel: X-Ray Foot AP Bilateral; Future; Expected date: 11/09/2018  Will get x-ray of the bilateral feet to look into further etiology, if any abnormality noted will refer to Podiatry  Patient clinically asymptomatic at this time  Advised to wear protective and comfortable shoes    Essential hypertension-blood pressure is stable today currently on amlodipine 2.5 mg carvedilol 25 mg twice daily and clonidine 0.1 mg patch    Compression deformity of vertebra  Age-related osteoporosis without current pathological fracture  -     alendronate (FOSAMAX) 70 MG tablet; Take 1 tablet (70 mg total) by mouth every 7 days.  Dispense: 4 tablet; Refill: 11  Will start on Fosamax as requested by patient  weekly  Advised to watch out for any side effects and notify us if having any problems    Atherosclerosis of abdominal aorta  Coronary artery disease involving native coronary artery of native heart without angina pectoris  As per cardiologist Dr Fischer     History of left breast cancer    Mixed hyperlipidemia-currently on Lipitor 80 mg daily

## 2018-11-12 ENCOUNTER — TELEPHONE (OUTPATIENT)
Dept: INTERNAL MEDICINE | Facility: CLINIC | Age: 78
End: 2018-11-12

## 2018-11-12 NOTE — TELEPHONE ENCOUNTER
----- Message from Kayy Rodriguez sent at 11/12/2018  9:04 AM CST -----  Contact: Aicha [phone] or [phone]  Pt is returning a missed call from Friday.

## 2018-11-12 NOTE — TELEPHONE ENCOUNTER
Returned pt's call. Informed her of x-ray results and podiatry referral. Pt was thankful for the call back.

## 2019-05-15 ENCOUNTER — TELEPHONE (OUTPATIENT)
Dept: INTERNAL MEDICINE | Facility: CLINIC | Age: 79
End: 2019-05-15

## 2019-05-15 DIAGNOSIS — Z85.3 HISTORY OF LEFT BREAST CANCER: Primary | ICD-10-CM

## 2019-05-15 NOTE — TELEPHONE ENCOUNTER
----- Message from Adamaris Zelaya sent at 5/15/2019 10:10 AM CDT -----  Contact: [phone]  Would like to consult with the nurse, patients would like to get an order for a mammogram, please call back at [phone], thanks sj  .Type:  Mammogram    Caller is requesting to schedule their annual mammogram appointment.  Order is not listed in EPIC.  Please enter order and contact patient to schedule.  Name of Caller ms giles  Where would they like the mammogram performed?[facility]  Would the patient rather a call back or a response via [facility]? Call back  Best Call Back Number:[phone]  Additional Information:

## 2019-06-24 ENCOUNTER — PES CALL (OUTPATIENT)
Dept: ADMINISTRATIVE | Facility: CLINIC | Age: 79
End: 2019-06-24

## 2019-07-26 ENCOUNTER — HOSPITAL ENCOUNTER (OUTPATIENT)
Dept: RADIOLOGY | Facility: HOSPITAL | Age: 79
Discharge: HOME | End: 2019-07-26
Attending: FAMILY MEDICINE
Payer: MEDICARE

## 2019-07-26 VITALS — BODY MASS INDEX: 23.41 KG/M2 | HEIGHT: 61 IN | WEIGHT: 124 LBS

## 2019-07-26 DIAGNOSIS — Z85.3 HISTORY OF LEFT BREAST CANCER: ICD-10-CM

## 2019-07-26 PROCEDURE — 77065 DX MAMMO INCL CAD UNI: CPT | Mod: 26,,, | Performed by: RADIOLOGY

## 2019-07-26 PROCEDURE — 77061 MAMMO DIGITAL DIAGNOSTIC RIGHT WITH TOMOSYNTHESIS_CAD: ICD-10-PCS | Mod: 26,,, | Performed by: RADIOLOGY

## 2019-07-26 PROCEDURE — 77065 MAMMO DIGITAL DIAGNOSTIC RIGHT WITH TOMOSYNTHESIS_CAD: ICD-10-PCS | Mod: 26,,, | Performed by: RADIOLOGY

## 2019-07-26 PROCEDURE — 77061 BREAST TOMOSYNTHESIS UNI: CPT | Mod: 26,,, | Performed by: RADIOLOGY

## 2019-07-26 PROCEDURE — 77061 BREAST TOMOSYNTHESIS UNI: CPT | Mod: TC

## 2019-07-26 PROCEDURE — 77065 DX MAMMO INCL CAD UNI: CPT | Mod: TC

## 2019-08-13 ENCOUNTER — OFFICE VISIT (OUTPATIENT)
Dept: INTERNAL MEDICINE | Facility: CLINIC | Age: 79
End: 2019-08-13
Payer: MEDICARE

## 2019-08-13 VITALS
HEART RATE: 69 BPM | BODY MASS INDEX: 23.6 KG/M2 | RESPIRATION RATE: 16 BRPM | DIASTOLIC BLOOD PRESSURE: 74 MMHG | TEMPERATURE: 99 F | OXYGEN SATURATION: 97 % | HEIGHT: 61 IN | SYSTOLIC BLOOD PRESSURE: 146 MMHG | WEIGHT: 125 LBS

## 2019-08-13 DIAGNOSIS — I70.0 ATHEROSCLEROSIS OF ABDOMINAL AORTA: ICD-10-CM

## 2019-08-13 DIAGNOSIS — I25.10 CORONARY ARTERY DISEASE INVOLVING NATIVE CORONARY ARTERY OF NATIVE HEART WITHOUT ANGINA PECTORIS: ICD-10-CM

## 2019-08-13 DIAGNOSIS — M81.0 AGE-RELATED OSTEOPOROSIS WITHOUT CURRENT PATHOLOGICAL FRACTURE: ICD-10-CM

## 2019-08-13 DIAGNOSIS — J30.1 CHRONIC SEASONAL ALLERGIC RHINITIS DUE TO POLLEN: ICD-10-CM

## 2019-08-13 DIAGNOSIS — I83.93 VARICOSE VEINS OF BOTH LOWER EXTREMITIES, UNSPECIFIED WHETHER COMPLICATED: ICD-10-CM

## 2019-08-13 DIAGNOSIS — Z85.3 HISTORY OF LEFT BREAST CANCER: ICD-10-CM

## 2019-08-13 DIAGNOSIS — M43.9 COMPRESSION DEFORMITY OF VERTEBRA: ICD-10-CM

## 2019-08-13 DIAGNOSIS — I70.1 LEFT RENAL ARTERY STENOSIS: ICD-10-CM

## 2019-08-13 DIAGNOSIS — F41.9 ANXIETY: ICD-10-CM

## 2019-08-13 DIAGNOSIS — E78.2 MIXED HYPERLIPIDEMIA: ICD-10-CM

## 2019-08-13 DIAGNOSIS — I10 ESSENTIAL HYPERTENSION: Primary | Chronic | ICD-10-CM

## 2019-08-13 PROCEDURE — 99999 PR PBB SHADOW E&M-EST. PATIENT-LVL III: CPT | Mod: PBBFAC,,, | Performed by: FAMILY MEDICINE

## 2019-08-13 PROCEDURE — 99999 PR PBB SHADOW E&M-EST. PATIENT-LVL III: ICD-10-PCS | Mod: PBBFAC,,, | Performed by: FAMILY MEDICINE

## 2019-08-13 PROCEDURE — 99213 OFFICE O/P EST LOW 20 MIN: CPT | Mod: PBBFAC | Performed by: FAMILY MEDICINE

## 2019-08-13 PROCEDURE — 99214 PR OFFICE/OUTPT VISIT, EST, LEVL IV, 30-39 MIN: ICD-10-PCS | Mod: S$PBB,,, | Performed by: FAMILY MEDICINE

## 2019-08-13 PROCEDURE — 99214 OFFICE O/P EST MOD 30 MIN: CPT | Mod: S$PBB,,, | Performed by: FAMILY MEDICINE

## 2019-08-13 RX ORDER — CITALOPRAM 10 MG/1
10 TABLET ORAL DAILY
Qty: 30 TABLET | Refills: 1 | Status: SHIPPED | OUTPATIENT
Start: 2019-08-13 | End: 2019-09-17 | Stop reason: SDUPTHER

## 2019-08-13 NOTE — PROGRESS NOTES
Subjective:       Patient ID: Aicha Rolle is a 79 y.o. female.    Chief Complaint: Annual Exam; Sinus Problem; and Dizziness    79-year-old female patient with Patient Active Problem List:     Hypertension     Hyperlipidemia     Coronary artery disease     Osteoporosis     Diverticulosis of colon (without mention of hemorrhage)     History of left breast cancer     Left renal artery stenosis     Atherosclerosis of abdominal aorta     Compression deformity of vertebra  Here for follow-up on chronic medical conditions  Patient reports that she has been taking her medications regularly and has been fluctuations in blood pressure followed by cardiologist Dr Maza   Patient denies any chronic low back pain status post ALFREDO.   Reports that she has been to Dr. Irene was done carotid Doppler and was told that she has left carotid stenosis, patient has been closely followed by Dr. dinh on and was advised to get repeat carotid Doppler in November.   Reports that she stays anxious and could be the reason for fluctuations in blood pressure.   Denies any depression  Stays physically active  Denies any chest pain or difficulty breathing or palpitations  Has been suffering from allergies and occasionally causing dizziness, reports postnasal drip and runny nose  Reports having varicose veins, and would like to try compression stockings    Review of Systems   Constitutional: Negative for fatigue.   HENT: Positive for congestion, postnasal drip, rhinorrhea and sneezing.    Eyes: Negative for visual disturbance.   Respiratory: Negative for cough and shortness of breath.    Cardiovascular: Negative for chest pain and leg swelling.   Gastrointestinal: Negative for abdominal pain, nausea and vomiting.   Musculoskeletal: Negative for myalgias.   Skin: Negative for rash.   Neurological: Positive for dizziness. Negative for syncope, light-headedness and headaches.   Psychiatric/Behavioral: Negative for sleep disturbance.         BP  "(!) 146/74 (BP Location: Right arm, Patient Position: Sitting, BP Method: Medium (Manual))   Pulse 69   Temp 98.6 °F (37 °C) (Tympanic)   Resp 16   Ht 5' 1" (1.549 m)   Wt 56.7 kg (125 lb)   SpO2 97%   BMI 23.62 kg/m²   Objective:      Physical Exam   Constitutional: She is oriented to person, place, and time. She appears well-developed and well-nourished.   HENT:   Head: Normocephalic and atraumatic.   Right Ear: External ear normal.   Left Ear: External ear normal.   Mouth/Throat: Oropharynx is clear and moist.   Positive for postnasal drip   Cardiovascular: Normal rate, regular rhythm and normal heart sounds.   No murmur heard.  No carotid bruit heard bilaterally   Pulmonary/Chest: Effort normal and breath sounds normal. She has no wheezes.   Abdominal: Soft. Bowel sounds are normal. There is no tenderness.   Musculoskeletal: She exhibits no edema.   Neurological: She is alert and oriented to person, place, and time.   Skin: Skin is warm and dry. No rash noted.   Noted varicose veins to bilateral lower extremities   Psychiatric: She has a normal mood and affect.         Assessment/Plan:   1. Essential hypertension  - Lipid panel; Future  - TSH; Future  - Urinalysis; Future  - Comprehensive metabolic panel; Future  Blood pressure fluctuant today, currently taking amlodipine 5 mg twice daily, carvedilol 25 mg twice daily, clonidine 0.1 mg patch weekly  Refuses hypertension digital program and would like to continue follow-up with Cardiology Dr Maza    2. Mixed hyperlipidemia  - Lipid panel; Future  Currently on Lipitor 80 mg daily    3. Coronary artery disease involving native coronary artery of native heart without angina pectoris  Followed by cardiologist Dr. Maza    4. History of left breast cancer    5. Left renal artery stenosis  6. Atherosclerosis of abdominal aorta  Carotid artery stenosis left   As per cardiology and opthalmology    7. Compression deformity of vertebra  8. Age-related " osteoporosis without current pathological fracture  Stable and asymptomatic  Currently taking Fosamax weekly and calcium with vitamin-D supplements    9. Anxiety  - citalopram (CELEXA) 10 MG tablet; Take 1 tablet (10 mg total) by mouth once daily.  Dispense: 30 tablet; Refill: 1  Will do a trial of Celexa 10 mg daily to lower anxiety and can help with fluctuations in blood pressure  Follow-up in 1 month    10. Chronic seasonal allergic rhinitis due to pollen  Advised to try taking over-the-counter Zyrtec and avoid decongestants  Flonase as needed    11. Varicose veins of both lower extremities, unspecified whether complicated  - COMPRESSION STOCKINGS  Compression stockings prescribed today

## 2019-08-23 ENCOUNTER — PES CALL (OUTPATIENT)
Dept: ADMINISTRATIVE | Facility: CLINIC | Age: 79
End: 2019-08-23

## 2019-09-03 ENCOUNTER — PATIENT OUTREACH (OUTPATIENT)
Dept: ADMINISTRATIVE | Facility: HOSPITAL | Age: 79
End: 2019-09-03

## 2019-09-10 ENCOUNTER — LAB VISIT (OUTPATIENT)
Dept: LAB | Facility: HOSPITAL | Age: 79
End: 2019-09-10
Attending: FAMILY MEDICINE
Payer: MEDICARE

## 2019-09-10 DIAGNOSIS — E78.2 MIXED HYPERLIPIDEMIA: ICD-10-CM

## 2019-09-10 DIAGNOSIS — I10 ESSENTIAL HYPERTENSION: Chronic | ICD-10-CM

## 2019-09-10 LAB
ALBUMIN SERPL BCP-MCNC: 3.7 G/DL (ref 3.5–5.2)
ALP SERPL-CCNC: 55 U/L (ref 55–135)
ALT SERPL W/O P-5'-P-CCNC: 21 U/L (ref 10–44)
ANION GAP SERPL CALC-SCNC: 9 MMOL/L (ref 8–16)
AST SERPL-CCNC: 27 U/L (ref 10–40)
BILIRUB SERPL-MCNC: 0.5 MG/DL (ref 0.1–1)
BUN SERPL-MCNC: 15 MG/DL (ref 8–23)
CALCIUM SERPL-MCNC: 9.1 MG/DL (ref 8.7–10.5)
CHLORIDE SERPL-SCNC: 106 MMOL/L (ref 95–110)
CHOLEST SERPL-MCNC: 146 MG/DL (ref 120–199)
CHOLEST/HDLC SERPL: 2.9 {RATIO} (ref 2–5)
CO2 SERPL-SCNC: 27 MMOL/L (ref 23–29)
CREAT SERPL-MCNC: 0.7 MG/DL (ref 0.5–1.4)
EST. GFR  (AFRICAN AMERICAN): >60 ML/MIN/1.73 M^2
EST. GFR  (NON AFRICAN AMERICAN): >60 ML/MIN/1.73 M^2
GLUCOSE SERPL-MCNC: 97 MG/DL (ref 70–110)
HDLC SERPL-MCNC: 50 MG/DL (ref 40–75)
HDLC SERPL: 34.2 % (ref 20–50)
LDLC SERPL CALC-MCNC: 82 MG/DL (ref 63–159)
NONHDLC SERPL-MCNC: 96 MG/DL
POTASSIUM SERPL-SCNC: 4.2 MMOL/L (ref 3.5–5.1)
PROT SERPL-MCNC: 6.8 G/DL (ref 6–8.4)
SODIUM SERPL-SCNC: 142 MMOL/L (ref 136–145)
TRIGL SERPL-MCNC: 70 MG/DL (ref 30–150)
TSH SERPL DL<=0.005 MIU/L-ACNC: 1.45 UIU/ML (ref 0.4–4)

## 2019-09-10 PROCEDURE — 36415 COLL VENOUS BLD VENIPUNCTURE: CPT | Mod: PO

## 2019-09-10 PROCEDURE — 80053 COMPREHEN METABOLIC PANEL: CPT

## 2019-09-10 PROCEDURE — 84443 ASSAY THYROID STIM HORMONE: CPT

## 2019-09-10 PROCEDURE — 80061 LIPID PANEL: CPT

## 2019-09-11 DIAGNOSIS — R82.90 ABNORMAL URINE FINDINGS: Primary | ICD-10-CM

## 2019-09-16 ENCOUNTER — PES CALL (OUTPATIENT)
Dept: ADMINISTRATIVE | Facility: CLINIC | Age: 79
End: 2019-09-16

## 2019-09-17 ENCOUNTER — OFFICE VISIT (OUTPATIENT)
Dept: INTERNAL MEDICINE | Facility: CLINIC | Age: 79
End: 2019-09-17
Payer: MEDICARE

## 2019-09-17 VITALS
BODY MASS INDEX: 23.73 KG/M2 | HEART RATE: 64 BPM | SYSTOLIC BLOOD PRESSURE: 134 MMHG | RESPIRATION RATE: 14 BRPM | TEMPERATURE: 98 F | WEIGHT: 125.69 LBS | DIASTOLIC BLOOD PRESSURE: 70 MMHG | OXYGEN SATURATION: 97 % | HEIGHT: 61 IN

## 2019-09-17 DIAGNOSIS — I70.1 LEFT RENAL ARTERY STENOSIS: ICD-10-CM

## 2019-09-17 DIAGNOSIS — M81.0 AGE-RELATED OSTEOPOROSIS WITHOUT CURRENT PATHOLOGICAL FRACTURE: ICD-10-CM

## 2019-09-17 DIAGNOSIS — R31.21 ASYMPTOMATIC MICROSCOPIC HEMATURIA: ICD-10-CM

## 2019-09-17 DIAGNOSIS — F41.1 GAD (GENERALIZED ANXIETY DISORDER): Primary | ICD-10-CM

## 2019-09-17 DIAGNOSIS — I70.0 ATHEROSCLEROSIS OF ABDOMINAL AORTA: ICD-10-CM

## 2019-09-17 DIAGNOSIS — F41.9 ANXIETY: ICD-10-CM

## 2019-09-17 DIAGNOSIS — I10 ESSENTIAL HYPERTENSION: ICD-10-CM

## 2019-09-17 DIAGNOSIS — Z85.3 HISTORY OF LEFT BREAST CANCER: ICD-10-CM

## 2019-09-17 DIAGNOSIS — M43.9 COMPRESSION DEFORMITY OF VERTEBRA: ICD-10-CM

## 2019-09-17 DIAGNOSIS — E78.5 HYPERLIPIDEMIA, UNSPECIFIED HYPERLIPIDEMIA TYPE: ICD-10-CM

## 2019-09-17 DIAGNOSIS — I25.10 CORONARY ARTERY DISEASE INVOLVING NATIVE CORONARY ARTERY OF NATIVE HEART WITHOUT ANGINA PECTORIS: ICD-10-CM

## 2019-09-17 PROCEDURE — 99999 PR PBB SHADOW E&M-EST. PATIENT-LVL IV: ICD-10-PCS | Mod: PBBFAC,,, | Performed by: FAMILY MEDICINE

## 2019-09-17 PROCEDURE — 90662 IIV NO PRSV INCREASED AG IM: CPT | Mod: PBBFAC

## 2019-09-17 PROCEDURE — 99214 OFFICE O/P EST MOD 30 MIN: CPT | Mod: PBBFAC | Performed by: FAMILY MEDICINE

## 2019-09-17 PROCEDURE — 99999 PR PBB SHADOW E&M-EST. PATIENT-LVL IV: CPT | Mod: PBBFAC,,, | Performed by: FAMILY MEDICINE

## 2019-09-17 PROCEDURE — 99214 PR OFFICE/OUTPT VISIT, EST, LEVL IV, 30-39 MIN: ICD-10-PCS | Mod: 25,S$PBB,, | Performed by: FAMILY MEDICINE

## 2019-09-17 PROCEDURE — 99214 OFFICE O/P EST MOD 30 MIN: CPT | Mod: 25,S$PBB,, | Performed by: FAMILY MEDICINE

## 2019-09-17 RX ORDER — CITALOPRAM 10 MG/1
10 TABLET ORAL DAILY
Qty: 90 TABLET | Refills: 1 | Status: SHIPPED | OUTPATIENT
Start: 2019-09-17 | End: 2020-03-31

## 2019-09-17 NOTE — PROGRESS NOTES
"Subjective:       Patient ID: Aicha Rolle is a 79 y.o. female.    Chief Complaint: Follow-up (1 month)    79-year-old female patient with Patient Active Problem List:     Essential hypertension     Hyperlipidemia     Coronary artery disease     Osteoporosis     Diverticulosis of colon (without mention of hemorrhage)     History of left breast cancer     Left renal artery stenosis     Atherosclerosis of abdominal aorta     Compression deformity of vertebra     Asymptomatic microscopic hematuria  Here for follow-up on anxiety, reports that she has been feeling better since taking Celexa  10 mg daily, since taking this medication patient denies any fluctuations in blood pressure  Denies any chest pain or difficulty breathing or palpitations, depression nausea vomiting    Review of Systems   Constitutional: Negative for fatigue.   Eyes: Negative for visual disturbance.   Respiratory: Negative for shortness of breath.    Cardiovascular: Negative for chest pain, palpitations and leg swelling.   Gastrointestinal: Negative for abdominal pain, nausea and vomiting.   Musculoskeletal: Negative for myalgias.   Skin: Negative for rash.   Neurological: Negative for light-headedness and headaches.   Psychiatric/Behavioral: Negative for decreased concentration, dysphoric mood and sleep disturbance. The patient is nervous/anxious.          /70 (BP Location: Right arm, Patient Position: Sitting, BP Method: Medium (Manual))   Pulse 64   Temp 97.7 °F (36.5 °C) (Tympanic)   Resp 14   Ht 5' 1" (1.549 m)   Wt 57 kg (125 lb 10.6 oz)   SpO2 97%   BMI 23.74 kg/m²   Objective:      Physical Exam   Constitutional: She is oriented to person, place, and time. She appears well-developed and well-nourished.   HENT:   Head: Normocephalic and atraumatic.   Mouth/Throat: Oropharynx is clear and moist.   Cardiovascular: Normal rate, regular rhythm and normal heart sounds.   No murmur heard.  Pulmonary/Chest: Effort normal and breath " sounds normal. She has no wheezes.   Abdominal: Soft. Bowel sounds are normal. There is no tenderness.   Musculoskeletal: She exhibits no edema.   Neurological: She is alert and oriented to person, place, and time.   Skin: Skin is warm and dry. No rash noted.   Psychiatric: She has a normal mood and affect.         Assessment/Plan:   1. DORIS (generalized anxiety disorder)  - citalopram (CELEXA) 10 MG tablet; Take 1 tablet (10 mg total) by mouth once daily.  Dispense: 90 tablet; Refill: 1  Stable on Celexa 10 mg, refill given today    2. Essential hypertension  Blood pressure is stable today currently taking amlodipine 5 mg twice daily carvedilol 25 mg twice daily and clonidine 0.1 patch week    3. Hyperlipidemia, unspecified hyperlipidemia type  Currently on Lipitor 40 mg daily    4. Coronary artery disease involving native coronary artery of native heart without angina pectoris  Stable followed by cardiology    6. Compression deformity of vertebra    7. Atherosclerosis of abdominal aorta    8. History of left breast cancer    9. Left renal artery stenosis    10. Age-related osteoporosis without current pathological fracture  Stable on Fosamax and calcium with vitamin-D supplements over-the-counter    11. Asymptomatic microscopic hematuria  - Ambulatory consult to Urology  Reviewed recent labs showing persistent blood in the urine and few white blood cells  Patient clinically asymptomatic Will plan to refer to Urology  Encouraged to drink adequate fluids       Flu shot given today

## 2019-09-18 ENCOUNTER — PATIENT OUTREACH (OUTPATIENT)
Dept: ADMINISTRATIVE | Facility: HOSPITAL | Age: 79
End: 2019-09-18

## 2020-02-05 ENCOUNTER — OFFICE VISIT (OUTPATIENT)
Dept: UROLOGY | Facility: CLINIC | Age: 80
End: 2020-02-05
Payer: MEDICARE

## 2020-02-05 VITALS
WEIGHT: 129.19 LBS | DIASTOLIC BLOOD PRESSURE: 84 MMHG | BODY MASS INDEX: 24.41 KG/M2 | SYSTOLIC BLOOD PRESSURE: 144 MMHG

## 2020-02-05 DIAGNOSIS — R31.9 HEMATURIA, UNSPECIFIED TYPE: Primary | ICD-10-CM

## 2020-02-05 LAB
BACTERIA #/AREA URNS AUTO: ABNORMAL /HPF
BILIRUB SERPL-MCNC: NORMAL MG/DL
BLOOD URINE, POC: NORMAL
COLOR, POC UA: YELLOW
GLUCOSE UR QL STRIP: NORMAL
HYALINE CASTS UR QL AUTO: 3 /LPF
KETONES UR QL STRIP: NORMAL
LEUKOCYTE ESTERASE URINE, POC: NORMAL
MICROSCOPIC COMMENT: ABNORMAL
NITRITE, POC UA: NORMAL
PH, POC UA: 5
POC RESIDUAL URINE VOLUME: 0 ML (ref 0–100)
PROTEIN, POC: NORMAL
RBC #/AREA URNS AUTO: 5 /HPF (ref 0–4)
SPECIFIC GRAVITY, POC UA: 1.02
UROBILINOGEN, POC UA: NORMAL
WBC #/AREA URNS AUTO: 1 /HPF (ref 0–5)

## 2020-02-05 PROCEDURE — 99204 OFFICE O/P NEW MOD 45 MIN: CPT | Mod: S$PBB,,, | Performed by: UROLOGY

## 2020-02-05 PROCEDURE — 87086 URINE CULTURE/COLONY COUNT: CPT

## 2020-02-05 PROCEDURE — 99999 PR PBB SHADOW E&M-EST. PATIENT-LVL III: CPT | Mod: PBBFAC,,, | Performed by: UROLOGY

## 2020-02-05 PROCEDURE — 99213 OFFICE O/P EST LOW 20 MIN: CPT | Mod: PBBFAC,25 | Performed by: UROLOGY

## 2020-02-05 PROCEDURE — 51798 US URINE CAPACITY MEASURE: CPT | Mod: PBBFAC | Performed by: UROLOGY

## 2020-02-05 PROCEDURE — 99999 PR PBB SHADOW E&M-EST. PATIENT-LVL III: ICD-10-PCS | Mod: PBBFAC,,, | Performed by: UROLOGY

## 2020-02-05 PROCEDURE — 99204 PR OFFICE/OUTPT VISIT, NEW, LEVL IV, 45-59 MIN: ICD-10-PCS | Mod: S$PBB,,, | Performed by: UROLOGY

## 2020-02-05 PROCEDURE — 81002 URINALYSIS NONAUTO W/O SCOPE: CPT | Mod: PBBFAC,59 | Performed by: UROLOGY

## 2020-02-05 PROCEDURE — 81001 URINALYSIS AUTO W/SCOPE: CPT

## 2020-02-05 NOTE — PROGRESS NOTES
Chief Complaint: Hematuria    HPI:   2/5/20: 80 yo woman dx with microhematuria by Dr. Hernandez.  No abd/pelvic pain and no exac/rel factors.  No gross hematuria.  No urolithiasis.  No urinary bother except mild DIANNA wears a safety pad.  No  history.  Normal sexual function.  CT pelvis 3 years ago was done that showed a 5 cm pre-sacral pelvic mass of indeterminate nature she says wasn't worked up can't explain.    Allergies:  Sulfa (sulfonamide antibiotics)    Medications: has a current medication list which includes the following prescription(s): amlodipine, atorvastatin, bimatoprost, biotin, calcium carbonate/vitamin d3, carvedilol, citalopram, clonidine 0.1 mg/24 hr td ptwk, clopidogrel, fluticasone propionate, nitrostat, alendronate, and cranberry fruit.    Review of Systems:  General: No fever, chills, fatigability, or weight loss.  Skin: No rashes, itching, or changes in color or texture of skin.  Chest: Denies IBARRA, cyanosis, wheezing, cough, and sputum production.  Abdomen: Appetite fine. No weight loss. Denies diarrhea, abdominal pain, hematemesis, or blood in stool.  Musculoskeletal: No joint stiffness or swelling. Denies back pain.  : As above.  All other review of systems negative.    PMH:   has a past medical history of Breast cancer (1995), Coronary artery disease, Diverticulosis of colon (without mention of hemorrhage) (11/13/2014), History of left breast cancer, Hyperlipidemia, Hypertension, and Osteoporosis.    PSH:   has a past surgical history that includes Tonsillectomy; Coronary angioplasty with stent; Partial hysterectomy; Abdominal surgery; left ankle surgery; Mastectomy; Elbow surgery (Left, 10/24/2016); Breast biopsy; and Breast reconstruction (Left).    FamHx: family history includes Breast cancer in her mother and other; Cancer in her mother; Heart disease in her father, son, and son; Hyperlipidemia in her son and son.    SocHx:  reports that she has never smoked. She has never used  smokeless tobacco. She reports that she drinks about 1.0 standard drinks of alcohol per week. She reports that she does not use drugs.     Physical Exam:  Vitals:   Vitals:    02/05/20 0859   BP: (!) 144/84     General: A&Ox3. No apparent distress. No deformities.  Neck: No masses. Normal thyroid.  Lungs: normal inspiration. No use of accessory muscles.  Heart: normal pulse. No arrhythmias.  Abdomen: Soft. NT. ND. No masses. No hernias. No hepatosplenomegaly.  Lymphatic: Neck and groin nodes negative.  Skin: The skin is warm and dry. No jaundice.  Ext: No c/c/e.  : External genitalia normal.     Labs/Studies:   Urinalysis performed in clinic, summary: UA normal exc 1+ ket, 1+ bili, 50 blood    Impression/Plan:   1. Cath UA/UCx to check hematuria today, but CT Urogram/Cysto to check for any pathology related to prior CT findings along with hematuria.

## 2020-02-05 NOTE — LETTER
February 5, 2020      Trish Hernandez MD  [address]  [place] [address]           [facility]  [address] [place] [id number]  Phone: [phone]  Fax: [phone]          Patient: Aicha Rolle   MR Number: [id number]   YOB: 1940   Date of Visit: 2/5/2020       Dear Dr. Trish Hernandez:    Thank you for referring Aicha Rolle to me for evaluation. Attached you will find relevant portions of my assessment and plan of care.    If you have questions, please do not hesitate to call me. I look forward to following Aicha Rolle along with you.    Sincerely,    Kirit Nascimento IV, MD    Enclosure  CC:  No Recipients    If you would like to receive this communication electronically, please contact [email or web address] or [phone] to request more information on [employer] access.    For providers and/or their staff who would like to refer a patient to [facility], please contact us through our one-stop-shop provider referral line, [facility] , at [phone].    If you feel you have received this communication in error or would no longer like to receive these types of communications, please e-mail [email or web address]

## 2020-02-06 LAB — BACTERIA UR CULT: NO GROWTH

## 2020-02-24 ENCOUNTER — HOSPITAL ENCOUNTER (OUTPATIENT)
Dept: RADIOLOGY | Facility: HOSPITAL | Age: 80
Discharge: HOME | End: 2020-02-24
Attending: UROLOGY
Payer: MEDICARE

## 2020-02-24 DIAGNOSIS — R31.9 HEMATURIA, UNSPECIFIED TYPE: ICD-10-CM

## 2020-02-24 PROCEDURE — 74178 CT ABD&PLV WO CNTR FLWD CNTR: CPT | Mod: TC

## 2020-02-24 PROCEDURE — 25500020 PHARM REV CODE 255: Performed by: UROLOGY

## 2020-02-24 RX ADMIN — IOHEXOL 125 ML: 350 INJECTION, SOLUTION INTRAVENOUS at 11:02

## 2020-03-31 DIAGNOSIS — F41.1 GAD (GENERALIZED ANXIETY DISORDER): ICD-10-CM

## 2020-03-31 RX ORDER — CITALOPRAM 10 MG/1
10 TABLET ORAL DAILY
Qty: 90 TABLET | Refills: 0 | Status: SHIPPED | OUTPATIENT
Start: 2020-03-31 | End: 2020-07-09

## 2020-05-12 ENCOUNTER — TELEPHONE (OUTPATIENT)
Dept: INTERNAL MEDICINE | Facility: CLINIC | Age: 80
End: 2020-05-12

## 2020-05-12 NOTE — TELEPHONE ENCOUNTER
----- Message from Joaquín Ibrahim sent at 5/12/2020  9:18 AM CDT -----  Contact: pt  Type: Needs Medical Advice    Who Called:  pt  Symptoms (please be specific):  UTI  How long has patient had these symptoms:  2 days  Pharmacy name and phone #:    [id number] - [place] - [address]  [address]  [place] [id number]  Phone: [phone] Fax: [phone]    Best Call Back Number: [phone]  Additional Information: pt also open to appt if necessary or just sending Rx to pharm also. Please call to discuss.

## 2020-05-12 NOTE — TELEPHONE ENCOUNTER
Diogo pt and she has been having UTI symptoms for a couple days. She has tried AZO tablets that helps with the burning senstation and cranberry juice. Pt has appointments today so she is asking to be seen tomorrow. Scheduled for tomorrow at 9:00 with Yana.

## 2020-05-13 ENCOUNTER — OFFICE VISIT (OUTPATIENT)
Dept: INTERNAL MEDICINE | Facility: CLINIC | Age: 80
End: 2020-05-13
Payer: MEDICARE

## 2020-05-13 VITALS
SYSTOLIC BLOOD PRESSURE: 120 MMHG | BODY MASS INDEX: 24.52 KG/M2 | HEART RATE: 85 BPM | HEIGHT: 61 IN | OXYGEN SATURATION: 94 % | WEIGHT: 129.88 LBS | TEMPERATURE: 99 F | DIASTOLIC BLOOD PRESSURE: 76 MMHG

## 2020-05-13 DIAGNOSIS — R35.0 URINARY FREQUENCY: Primary | ICD-10-CM

## 2020-05-13 PROCEDURE — 99214 OFFICE O/P EST MOD 30 MIN: CPT | Mod: S$PBB,,, | Performed by: PHYSICIAN ASSISTANT

## 2020-05-13 PROCEDURE — 99999 PR PBB SHADOW E&M-EST. PATIENT-LVL IV: CPT | Mod: PBBFAC,,, | Performed by: PHYSICIAN ASSISTANT

## 2020-05-13 PROCEDURE — 99214 OFFICE O/P EST MOD 30 MIN: CPT | Mod: PBBFAC | Performed by: PHYSICIAN ASSISTANT

## 2020-05-13 PROCEDURE — 99999 PR PBB SHADOW E&M-EST. PATIENT-LVL IV: ICD-10-PCS | Mod: PBBFAC,,, | Performed by: PHYSICIAN ASSISTANT

## 2020-05-13 PROCEDURE — 99214 PR OFFICE/OUTPT VISIT, EST, LEVL IV, 30-39 MIN: ICD-10-PCS | Mod: S$PBB,,, | Performed by: PHYSICIAN ASSISTANT

## 2020-05-13 RX ORDER — CIPROFLOXACIN 250 MG/1
250 TABLET, FILM COATED ORAL EVERY 12 HOURS
Qty: 6 TABLET | Refills: 0 | Status: SHIPPED | OUTPATIENT
Start: 2020-05-13 | End: 2020-05-16

## 2020-05-13 NOTE — PROGRESS NOTES
Subjective:      Patient ID: Aihca Rolle is a 80 y.o. female.    Chief Complaint: Urinary Tract Infection    Urinary Frequency    This is a new problem. Episode onset: 3 days. The problem occurs every urination. The problem has been unchanged. The quality of the pain is described as burning. The pain is mild. There has been no fever. Associated symptoms include frequency, hematuria and urgency. Pertinent negatives include no behavior changes, chills, discharge, flank pain, hesitancy, nausea, possible pregnancy, sweats, vomiting, weight loss, bubble bath use, constipation, rash or withholding. Treatments tried: AZO. The treatment provided mild relief. Her past medical history is significant for recurrent UTIs. There is no history of catheterization, diabetes insipidus, diabetes mellitus, genitourinary reflux, hypertension, kidney stones, a single kidney, STD, urinary stasis or a urological procedure.       Review of Systems   Constitutional: Negative for activity change, appetite change, chills, diaphoresis, fatigue, fever, unexpected weight change and weight loss.   HENT: Negative.    Eyes: Negative.    Respiratory: Negative for cough, chest tightness and shortness of breath.    Cardiovascular: Negative for chest pain, palpitations and leg swelling.   Gastrointestinal: Positive for abdominal pain (suprapubic). Negative for abdominal distention, anal bleeding, blood in stool, constipation, diarrhea, nausea, rectal pain and vomiting.   Endocrine: Negative for cold intolerance, heat intolerance, polydipsia, polyphagia and polyuria.   Genitourinary: Positive for dysuria, frequency, hematuria, pelvic pain and urgency. Negative for decreased urine volume, difficulty urinating, dyspareunia, enuresis, flank pain, genital sores, hesitancy, menstrual problem, vaginal bleeding, vaginal discharge and vaginal pain.   Musculoskeletal: Negative for back pain.   Skin: Negative for color change, pallor, rash and wound.  "  Neurological: Negative for dizziness, weakness and headaches.     Objective:   /76   Pulse 85   Temp 98.6 °F (37 °C) (Oral)   Ht 5' 1" (1.549 m)   Wt 58.9 kg (129 lb 13.6 oz)   SpO2 (!) 94%   BMI 24.54 kg/m²     Physical Exam   Constitutional: She is oriented to person, place, and time. She appears well-developed and well-nourished. No distress.   HENT:   Head: Normocephalic and atraumatic.   Right Ear: External ear normal.   Left Ear: External ear normal.   Nose: Nose normal.   Mouth/Throat: Oropharynx is clear and moist.   Eyes: Pupils are equal, round, and reactive to light. Conjunctivae and EOM are normal.   Neck: Normal range of motion.   Cardiovascular: Normal rate, regular rhythm and normal heart sounds. Exam reveals no gallop and no friction rub.   No murmur heard.  Pulmonary/Chest: Effort normal and breath sounds normal. No respiratory distress. She has no wheezes. She has no rales. She exhibits no tenderness.   Abdominal: Soft. Bowel sounds are normal. She exhibits no distension, no pulsatile liver, no fluid wave, no ascites, no pulsatile midline mass and no mass. There is no hepatosplenomegaly, splenomegaly or hepatomegaly. There is tenderness in the suprapubic area. There is no rigidity, no rebound, no guarding and no CVA tenderness.   Musculoskeletal: Normal range of motion.   Neurological: She is alert and oriented to person, place, and time.   Skin: Skin is warm. No rash noted. She is not diaphoretic.   Psychiatric: She has a normal mood and affect. Her behavior is normal. Judgment and thought content normal.   Vitals reviewed.      Assessment:     1. Urinary frequency      Plan:   Urinary frequency  -     Urinalysis; Future; Expected date: 05/13/2020  -     Urine culture; Future  -     ciprofloxacin HCl (CIPRO) 250 MG tablet; Take 1 tablet (250 mg total) by mouth every 12 (twelve) hours. for 3 days  Dispense: 6 tablet; Refill: 0    -increase fluids  Educational handout on " over-the-counter medications and at-home conservative care, pertinent to the patients diagnosis today, was handed to the patient and discussed in detail.      Follow up if symptoms worsen or fail to improve.

## 2020-05-13 NOTE — PATIENT INSTRUCTIONS
Urethritis, Infection vs. Chemical (Adult Female)    You have urethritis. This means an inflammation in your urethra. The urethra is the tube that drains the urine out of your bladder. Urethritis is most often caused by a bacterial infection. The infection may be from gonorrhea, chlamydia, or another sexually transmitted disease (STD). But other things can also cause it. These things include irritation from soap, lotion, deodorant, or spermicides. Hormone changes that happen after menopause can also cause urethritis. The cause of your urethritis is uncertain.  Women with urethritis often don't have symptoms. When symptoms do happen, they can be the same as a urinary tract infection or bladder infection. Symptoms can include:  · Burning or pain when urinating  · Feeling like you have to urinate often  · Pus coming from your vagina  · Pressure or pain in your lower abdomen  · Pain when you have sex  Urethritis caused by bacteria is treated with antibiotics. Urethritis may clear up in a few weeks or months, even without treatment. But if you don't get treatment, the bacteria that cause the infection can stay in the urethra. Even if symptoms go away, you can still have the infection. And you can spread it to others.  Your sex partner or partners also need to be treated. This is true even if they have no symptoms. You can get infected again if they aren't treated and you have sex with them. Your partner should call his or her healthcare provider to be looked at and treated.  Home care  Follow these guidelines when caring for yourself at home:  · Stop using any soap, lotions, or other chemicals that may cause irritation.  · If you were given antibiotics, take them until they are all gone, or until your healthcare provider tells you to stop. It's important to finish the antibiotics even if your symptoms go away. This is to make sure the infection has completely cleared up.  · Don't have sex until both you and your  partner have finished all of the antibiotics, and your provider tells you that you cannot pass on the infection.  · You can take acetaminophen or ibuprofen for pain, unless you were given a different pain medicine to use. If you have chronic liver or kidney disease, talk with your provider before taking these medicines. Also talk with your provider if you've had a stomach ulcer or GI bleeding or are taking blood thinner medicines.  · Learn about safe sex practices and use them. The safest sex is with a partner who does not have an STD and only has sex with you. Condoms can keep you from getting some STDs. These include gonorrhea, chlamydia and HIV. But condoms are not a guarantee you will not get these diseases.  Follow-up care  Follow up with your healthcare provider, or as advised. If an STD culture was taken, call as directed for the result. If you are diagnosed with an STD, follow up with your provider or your local health department. You should have a complete STD screening, including HIV testing. For more information, call CDC-INFO at 967-052-3620.  When to seek medical advice  Call your healthcare provider right away if any of these occur:  · You don't get better after 3 days  · Fever of 100.4ºF (38ºC) or higher, or as directed by your healthcare provider  · New pain in your lower abdomen or back  · Pain in your lower abdomen or back that gets worse  · Repeated vomiting  · Vaginal discharge or unexpected vaginal bleeding  · Weakness, dizziness, or fainting  · You can't urinate because of the pain  · Rash or joint pain  · Painful open sores on the outer vaginal area  · Enlarged, painful lumps (lymph nodes) in your groin   Date Last Reviewed: 10/1/2016  © 4646-9363 [employer]. [address] [place] [address]. All rights reserved. This information is not intended as a substitute for professional medical care. Always follow your healthcare professional's  instructions.        Understanding Urinary Tract Infections (UTIs)  Most UTIs are caused by bacteria, although they may also be caused by viruses or fungi. Bacteria from the bowel are the most common source of infection. The infection may start because of any of the following:  · Sexual activity. During sex, bacteria can travel from the penis, vagina, or rectum into the urethra.   · Bacteria on the skin outside the rectum may travel into the urethra. This is more common in women since the rectum and urethra are closer to each other than in men. Wiping from front to back after using the toilet and keeping the area clean can help prevent germs from getting to the urethra.  · Blockage of urine flow through the urinary tract. If urine sits too long, germs may start to grow out of control.      Parts of the urinary tract  The infection can occur in any part of the urinary tract.  · The kidneys collect and store urine.  · The ureters carry urine from the kidneys to the bladder.  · The bladder holds urine until you are ready to let it out.  · The urethra carries urine from the bladder out of the body. It is shorter in women, so bacteria can move through it more easily. The urethra is longer in men, so a UTI is less likely to reach the bladder or kidneys in men.  Date Last Reviewed: 1/1/2017  © 0288-5226 [employer]. [address] [place] [address], [place] [address]. All rights reserved. This information is not intended as a substitute for professional medical care. Always follow your healthcare professional's instructions.        Urinary Tract Infections in Women    Urinary tract infections (UTIs) are most often caused by bacteria (germs). These bacteria enter the urinary tract. The bacteria may come from outside the body. Or they may travel from the skin outside the rectum or vagina into the urethra. Female anatomy makes it easy for bacteria from the bowel to enter a womans urinary tract, which is the most common  source of UTI. This means women develop UTIs more often than men. Pain in or around the urinary tract is a common UTI symptom. But the only way to know for sure if you have a UTI for the healthcare provider to test your urine. The two tests that may be done are the urinalysis and urine culture.  Types of UTIs  · Cystitis: A bladder infection (cystitis) is the most common UTI in women. You may have urgent or frequent urination. You may also have pain, burning when you urinate, and bloody urine.  · Urethritis: This is an inflamed urethra, which is the tube that carries urine from the bladder to outside the body. You may have lower stomach or back pain. You may also have urgent or frequent urination.  · Pyelonephritis: This is a kidney infection. If not treated, it can be serious and damage your kidneys. In severe cases, you may be hospitalized. You may have a fever and lower back pain.  Medicines to treat a UTI  Most UTIs are treated with antibiotics. These kill the bacteria. The length of time you need to take them depends on the type of infection. It may be as short as 3 days. If you have repeated UTIs, a low-dose antibiotic may be needed for several months. Take antibiotics exactly as directed. Dont stop taking them until all of the medicine is gone. If you stop taking the antibiotic too soon, the infection may not go away, and you may develop a resistance to the antibiotic. This can make it much harder to treat.  Lifestyle changes to treat and prevent UTIs  The lifestyle changes below will help get rid of your UTI. They may also help prevent future UTIs.  · Drink plenty of fluids. This includes water, juice, or other caffeine-free drinks. Fluids help flush bacteria out of your body.  · Empty your bladder. Always empty your bladder when you feel the urge to urinate. And always urinate before going to sleep. Urine that stays in your bladder can lead to infection. Try to urinate before and after sex as  well.  · Practice good personal hygiene. Wipe yourself from front to back after using the toilet. This helps keep bacteria from getting into the urethra.  · Use condoms during sex. These help prevent UTIs caused by sexually transmitted bacteria. Also, avoid using spermicides during sex. These can increase the risk of UTIs. Choose other forms of birth control instead. For women who tend to get UTIs after sex, a low-dose of a preventive antibiotic may be used. Be sure to discuss this option with your healthcare provider.  · Follow up with your healthcare provider as directed. He or she may test to make sure the infection has cleared. If needed, more treatment may be started.  Date Last Reviewed: 1/1/2017  © 7845-6429 [employer]. [address] [place] [address], [place] [address]. All rights reserved. This information is not intended as a substitute for professional medical care. Always follow your healthcare professional's instructions.

## 2020-08-21 ENCOUNTER — TELEPHONE (OUTPATIENT)
Dept: INTERNAL MEDICINE | Facility: CLINIC | Age: 80
End: 2020-08-21

## 2020-08-21 DIAGNOSIS — Z85.3 HISTORY OF LEFT BREAST CANCER: Primary | ICD-10-CM

## 2020-08-21 NOTE — TELEPHONE ENCOUNTER
----- Message from Kari Wing sent at 8/21/2020  4:23 PM CDT -----  Type:  Mammogram    Caller is requesting to schedule their annual mammogram appointment.  Order is not listed in EPIC.  Please enter order and contact patient to schedule.  Name of Caller:pt   Where would they like the mammogram performed?[facility]  Would the patient rather a call back or a response via [facility]? Callback   Best Call Back Number:[phone]   Additional Information:

## 2020-09-08 ENCOUNTER — HOSPITAL ENCOUNTER (OUTPATIENT)
Dept: RADIOLOGY | Facility: HOSPITAL | Age: 80
Discharge: HOME | End: 2020-09-08
Attending: FAMILY MEDICINE
Payer: MEDICARE

## 2020-09-08 DIAGNOSIS — Z12.31 BREAST CANCER SCREENING BY MAMMOGRAM: ICD-10-CM

## 2020-09-08 DIAGNOSIS — Z85.3 HISTORY OF LEFT BREAST CANCER: ICD-10-CM

## 2020-09-08 PROCEDURE — 77067 SCR MAMMO BI INCL CAD: CPT | Mod: 26,52,, | Performed by: RADIOLOGY

## 2020-09-08 PROCEDURE — 77067 SCR MAMMO BI INCL CAD: CPT | Mod: TC

## 2020-09-08 PROCEDURE — 77067 MAMMO DIGITAL SCREENING RIGHT WITH TOMOSYNTHESIS_CAD: ICD-10-PCS | Mod: 26,52,, | Performed by: RADIOLOGY

## 2020-09-08 PROCEDURE — 77063 MAMMO DIGITAL SCREENING RIGHT WITH TOMOSYNTHESIS_CAD: ICD-10-PCS | Mod: 26,52,, | Performed by: RADIOLOGY

## 2020-09-08 PROCEDURE — 77063 BREAST TOMOSYNTHESIS BI: CPT | Mod: 26,52,, | Performed by: RADIOLOGY

## 2020-09-21 ENCOUNTER — TELEPHONE (OUTPATIENT)
Dept: INTERNAL MEDICINE | Facility: CLINIC | Age: 80
End: 2020-09-21

## 2020-09-21 DIAGNOSIS — I10 ESSENTIAL HYPERTENSION: Primary | ICD-10-CM

## 2020-09-21 DIAGNOSIS — E78.5 HYPERLIPIDEMIA, UNSPECIFIED HYPERLIPIDEMIA TYPE: ICD-10-CM

## 2020-09-21 NOTE — TELEPHONE ENCOUNTER
----- Message from Sanjuanita Zarate MA sent at 9/21/2020  2:59 PM CDT -----  Regarding: FW: orders  Contact: pt [phone]  Pt requesting lab orders placed to complete prior to appt. Please Advise.  ----- Message -----  From: Sofya Huerta  Sent: 9/21/2020   1:03 PM CDT  To: [facility] Staff  Subject: orders                                           Pt calling in regards to  orders for labs that is needing to be schedule before her appt on 10/06  Please contact pt

## 2020-10-06 ENCOUNTER — LAB VISIT (OUTPATIENT)
Dept: LAB | Facility: HOSPITAL | Age: 80
End: 2020-10-06
Attending: FAMILY MEDICINE
Payer: MEDICARE

## 2020-10-06 ENCOUNTER — OFFICE VISIT (OUTPATIENT)
Dept: INTERNAL MEDICINE | Facility: CLINIC | Age: 80
End: 2020-10-06
Payer: MEDICARE

## 2020-10-06 VITALS
HEART RATE: 67 BPM | HEIGHT: 61 IN | WEIGHT: 129.63 LBS | BODY MASS INDEX: 24.47 KG/M2 | OXYGEN SATURATION: 98 % | DIASTOLIC BLOOD PRESSURE: 72 MMHG | TEMPERATURE: 97 F | SYSTOLIC BLOOD PRESSURE: 136 MMHG | RESPIRATION RATE: 16 BRPM

## 2020-10-06 DIAGNOSIS — I10 ESSENTIAL HYPERTENSION: ICD-10-CM

## 2020-10-06 DIAGNOSIS — I10 ESSENTIAL HYPERTENSION: Primary | ICD-10-CM

## 2020-10-06 DIAGNOSIS — R31.21 ASYMPTOMATIC MICROSCOPIC HEMATURIA: ICD-10-CM

## 2020-10-06 DIAGNOSIS — R82.90 ABNORMAL URINE FINDINGS: Primary | ICD-10-CM

## 2020-10-06 DIAGNOSIS — Z85.3 HISTORY OF LEFT BREAST CANCER: ICD-10-CM

## 2020-10-06 DIAGNOSIS — F41.1 GAD (GENERALIZED ANXIETY DISORDER): ICD-10-CM

## 2020-10-06 DIAGNOSIS — I70.1 LEFT RENAL ARTERY STENOSIS: ICD-10-CM

## 2020-10-06 DIAGNOSIS — E78.2 MIXED HYPERLIPIDEMIA: ICD-10-CM

## 2020-10-06 DIAGNOSIS — M81.0 AGE-RELATED OSTEOPOROSIS WITHOUT CURRENT PATHOLOGICAL FRACTURE: ICD-10-CM

## 2020-10-06 DIAGNOSIS — I70.0 ATHEROSCLEROSIS OF ABDOMINAL AORTA: ICD-10-CM

## 2020-10-06 DIAGNOSIS — M43.9 COMPRESSION DEFORMITY OF VERTEBRA: ICD-10-CM

## 2020-10-06 DIAGNOSIS — I25.10 CORONARY ARTERY DISEASE INVOLVING NATIVE CORONARY ARTERY OF NATIVE HEART WITHOUT ANGINA PECTORIS: ICD-10-CM

## 2020-10-06 PROBLEM — I65.21 OCCLUSION AND STENOSIS OF RIGHT CAROTID ARTERY: Status: ACTIVE | Noted: 2020-03-18

## 2020-10-06 LAB
ALBUMIN SERPL BCP-MCNC: 4.4 G/DL (ref 3.5–5.2)
ALP SERPL-CCNC: 69 U/L (ref 55–135)
ALT SERPL W/O P-5'-P-CCNC: 19 U/L (ref 10–44)
ANION GAP SERPL CALC-SCNC: 7 MMOL/L (ref 8–16)
AST SERPL-CCNC: 27 U/L (ref 10–40)
BASOPHILS # BLD AUTO: 0.05 K/UL (ref 0–0.2)
BASOPHILS NFR BLD: 0.8 % (ref 0–1.9)
BILIRUB SERPL-MCNC: 0.6 MG/DL (ref 0.1–1)
BUN SERPL-MCNC: 12 MG/DL (ref 8–23)
CALCIUM SERPL-MCNC: 10.2 MG/DL (ref 8.7–10.5)
CHLORIDE SERPL-SCNC: 100 MMOL/L (ref 95–110)
CHOLEST SERPL-MCNC: 187 MG/DL (ref 120–199)
CHOLEST/HDLC SERPL: 3.2 {RATIO} (ref 2–5)
CO2 SERPL-SCNC: 30 MMOL/L (ref 23–29)
CREAT SERPL-MCNC: 0.8 MG/DL (ref 0.5–1.4)
DIFFERENTIAL METHOD: ABNORMAL
EOSINOPHIL # BLD AUTO: 0.6 K/UL (ref 0–0.5)
EOSINOPHIL NFR BLD: 9.4 % (ref 0–8)
ERYTHROCYTE [DISTWIDTH] IN BLOOD BY AUTOMATED COUNT: 12.8 % (ref 11.5–14.5)
EST. GFR  (AFRICAN AMERICAN): >60 ML/MIN/1.73 M^2
EST. GFR  (NON AFRICAN AMERICAN): >60 ML/MIN/1.73 M^2
GLUCOSE SERPL-MCNC: 118 MG/DL (ref 70–110)
HCT VFR BLD AUTO: 45.8 % (ref 37–48.5)
HDLC SERPL-MCNC: 58 MG/DL (ref 40–75)
HDLC SERPL: 31 % (ref 20–50)
HGB BLD-MCNC: 14.4 G/DL (ref 12–16)
IMM GRANULOCYTES # BLD AUTO: 0.01 K/UL (ref 0–0.04)
IMM GRANULOCYTES NFR BLD AUTO: 0.2 % (ref 0–0.5)
LDLC SERPL CALC-MCNC: 99.6 MG/DL (ref 63–159)
LYMPHOCYTES # BLD AUTO: 2 K/UL (ref 1–4.8)
LYMPHOCYTES NFR BLD: 30.5 % (ref 18–48)
MCH RBC QN AUTO: 31.9 PG (ref 27–31)
MCHC RBC AUTO-ENTMCNC: 31.4 G/DL (ref 32–36)
MCV RBC AUTO: 101 FL (ref 82–98)
MONOCYTES # BLD AUTO: 0.6 K/UL (ref 0.3–1)
MONOCYTES NFR BLD: 9.6 % (ref 4–15)
NEUTROPHILS # BLD AUTO: 3.3 K/UL (ref 1.8–7.7)
NEUTROPHILS NFR BLD: 49.5 % (ref 38–73)
NONHDLC SERPL-MCNC: 129 MG/DL
NRBC BLD-RTO: 0 /100 WBC
PLATELET # BLD AUTO: 269 K/UL (ref 150–350)
PMV BLD AUTO: 9.9 FL (ref 9.2–12.9)
POTASSIUM SERPL-SCNC: 4.9 MMOL/L (ref 3.5–5.1)
PROT SERPL-MCNC: 8.1 G/DL (ref 6–8.4)
RBC # BLD AUTO: 4.52 M/UL (ref 4–5.4)
SODIUM SERPL-SCNC: 137 MMOL/L (ref 136–145)
TRIGL SERPL-MCNC: 147 MG/DL (ref 30–150)
TSH SERPL DL<=0.005 MIU/L-ACNC: 1.73 UIU/ML (ref 0.4–4)
WBC # BLD AUTO: 6.58 K/UL (ref 3.9–12.7)

## 2020-10-06 PROCEDURE — 99214 OFFICE O/P EST MOD 30 MIN: CPT | Mod: 25,S$PBB,, | Performed by: FAMILY MEDICINE

## 2020-10-06 PROCEDURE — 99214 OFFICE O/P EST MOD 30 MIN: CPT | Mod: PBBFAC | Performed by: FAMILY MEDICINE

## 2020-10-06 PROCEDURE — 80061 LIPID PANEL: CPT

## 2020-10-06 PROCEDURE — 90694 VACC AIIV4 NO PRSRV 0.5ML IM: CPT | Mod: PBBFAC

## 2020-10-06 PROCEDURE — 99214 PR OFFICE/OUTPT VISIT, EST, LEVL IV, 30-39 MIN: ICD-10-PCS | Mod: 25,S$PBB,, | Performed by: FAMILY MEDICINE

## 2020-10-06 PROCEDURE — 85025 COMPLETE CBC W/AUTO DIFF WBC: CPT

## 2020-10-06 PROCEDURE — G0008 ADMIN INFLUENZA VIRUS VAC: HCPCS | Mod: PBBFAC

## 2020-10-06 PROCEDURE — 80053 COMPREHEN METABOLIC PANEL: CPT

## 2020-10-06 PROCEDURE — 99999 PR PBB SHADOW E&M-EST. PATIENT-LVL IV: ICD-10-PCS | Mod: PBBFAC,,, | Performed by: FAMILY MEDICINE

## 2020-10-06 PROCEDURE — 36415 COLL VENOUS BLD VENIPUNCTURE: CPT

## 2020-10-06 PROCEDURE — 84443 ASSAY THYROID STIM HORMONE: CPT

## 2020-10-06 PROCEDURE — 99999 PR PBB SHADOW E&M-EST. PATIENT-LVL IV: CPT | Mod: PBBFAC,,, | Performed by: FAMILY MEDICINE

## 2020-10-06 RX ORDER — NITROFURANTOIN 25; 75 MG/1; MG/1
100 CAPSULE ORAL 2 TIMES DAILY
Qty: 10 CAPSULE | Refills: 0 | Status: SHIPPED | OUTPATIENT
Start: 2020-10-06 | End: 2020-10-09 | Stop reason: SDUPTHER

## 2020-10-06 RX ORDER — CITALOPRAM 10 MG/1
10 TABLET ORAL DAILY
Qty: 90 TABLET | Refills: 1 | Status: SHIPPED | OUTPATIENT
Start: 2020-10-06 | End: 2021-04-18

## 2020-10-06 RX ORDER — LORATADINE 10 MG/1
10 TABLET ORAL DAILY
COMMUNITY

## 2020-10-06 NOTE — PROGRESS NOTES
"Subjective:       Patient ID: Aicha Rolle is a 80 y.o. female.    Chief Complaint: Annual Exam and Mass ([facility])    80-year-old female patient with Patient Active Problem List:     Essential hypertension     Mixed hyperlipidemia     Coronary artery disease     Osteoporosis     Diverticulosis of colon (without mention of hemorrhage)     History of left breast cancer     Left renal artery stenosis     Atherosclerosis of abdominal aorta     Compression deformity of vertebra     Asymptomatic microscopic hematuria     Occlusion and stenosis of right carotid artery     DORIS (generalized anxiety disorder)  Here for follow-up on chronic medical conditions and requesting refill on Celexa, has been doing well with taking this medication for anxiety.  Denies any chest pain or difficulty breathing or dizziness, has been followed by her cardiologist Dr. Maza      Review of Systems   Constitutional: Negative for fatigue.   Eyes: Negative for visual disturbance.   Respiratory: Negative for shortness of breath.    Cardiovascular: Negative for chest pain and leg swelling.   Gastrointestinal: Negative for abdominal pain, nausea and vomiting.   Musculoskeletal: Negative for myalgias.   Skin: Negative for rash.   Neurological: Negative for dizziness, syncope, light-headedness and headaches.   Psychiatric/Behavioral: Negative for sleep disturbance.         /72 (BP Location: Right arm, Patient Position: Sitting, BP Method: Medium (Manual))   Pulse 67   Temp 96.7 °F (35.9 °C) (Tympanic)   Resp 16   Ht 5' 1" (1.549 m)   Wt 58.8 kg (129 lb 10.1 oz)   SpO2 98%   BMI 24.49 kg/m²   Objective:      Physical Exam  Constitutional:       Appearance: She is well-developed.   HENT:      Head: Normocephalic and atraumatic.   Neck:      Vascular: No carotid bruit.   Cardiovascular:      Rate and Rhythm: Normal rate and regular rhythm.      Heart sounds: Normal heart sounds. No murmur.   Pulmonary:      Effort: Pulmonary effort is " normal.      Breath sounds: Normal breath sounds. No wheezing.   Abdominal:      General: Bowel sounds are normal.      Palpations: Abdomen is soft.      Tenderness: There is no abdominal tenderness.   Skin:     General: Skin is warm and dry.      Findings: No rash.   Neurological:      Mental Status: She is alert and oriented to person, place, and time.   Psychiatric:         Mood and Affect: Mood normal.           Assessment/Plan:   1. Essential hypertension  - Comprehensive Metabolic Panel; Future  - TSH; Future  - Lipid Panel; Future  - Urinalysis; Future  Blood pressure is stable today currently on clonidine 0.1 mg patch weekly, amlodipine 5 mg twice daily and carvedilol 25 mg twice daily followed by her cardiologist     2. Mixed hyperlipidemia  - Lipid Panel; Future  Currently diet controlled    3. Coronary artery disease involving native coronary artery of native heart without angina pectoris  - CBC auto differential; Future  4. Left renal artery stenosis  5. Atherosclerosis of abdominal aorta  Followed by Cardiology currently on Plavix    6. Age-related osteoporosis without current pathological fracture  - DXA Bone Density Spine And Hip; Future  7. Compression deformity of vertebra  Due for DEXA scan  Taking calcium with vitamin-D supplements over-the-counter    8. Asymptomatic microscopic hematuria  - CBC auto differential; Future  - Urinalysis; Future    9. History of left breast cancer    10. DORIS (generalized anxiety disorder)  - citalopram (CELEXA) 10 MG tablet; Take 1 tablet (10 mg total) by mouth once daily.  Dispense: 90 tablet; Refill: 1  Stable on Celexa 10 mg daily    Flu shot given today

## 2020-10-09 DIAGNOSIS — R82.90 ABNORMAL URINE FINDINGS: ICD-10-CM

## 2020-10-09 RX ORDER — NITROFURANTOIN 25; 75 MG/1; MG/1
100 CAPSULE ORAL 2 TIMES DAILY
Qty: 10 CAPSULE | Refills: 0 | Status: SHIPPED | OUTPATIENT
Start: 2020-10-09 | End: 2020-10-14

## 2021-01-13 ENCOUNTER — IMMUNIZATION (OUTPATIENT)
Dept: INTERNAL MEDICINE | Facility: CLINIC | Age: 81
End: 2021-01-13
Payer: MEDICARE

## 2021-01-13 DIAGNOSIS — Z23 NEED FOR VACCINATION: ICD-10-CM

## 2021-01-13 PROCEDURE — 91300 COVID-19, MRNA, LNP-S, PF, 30 MCG/0.3 ML DOSE VACCINE: CPT | Mod: PBBFAC

## 2021-02-03 ENCOUNTER — IMMUNIZATION (OUTPATIENT)
Dept: INTERNAL MEDICINE | Facility: CLINIC | Age: 81
End: 2021-02-03
Payer: MEDICARE

## 2021-02-03 DIAGNOSIS — Z23 NEED FOR VACCINATION: Primary | ICD-10-CM

## 2021-02-03 PROCEDURE — 91300 COVID-19, MRNA, LNP-S, PF, 30 MCG/0.3 ML DOSE VACCINE: CPT | Mod: PBBFAC

## 2021-02-03 PROCEDURE — 0002A COVID-19, MRNA, LNP-S, PF, 30 MCG/0.3 ML DOSE VACCINE: CPT | Mod: PBBFAC

## 2021-04-06 ENCOUNTER — OFFICE VISIT (OUTPATIENT)
Dept: INTERNAL MEDICINE | Facility: CLINIC | Age: 81
End: 2021-04-06
Payer: MEDICARE

## 2021-04-06 ENCOUNTER — LAB VISIT (OUTPATIENT)
Dept: LAB | Facility: HOSPITAL | Age: 81
End: 2021-04-06
Attending: FAMILY MEDICINE
Payer: MEDICARE

## 2021-04-06 VITALS
HEART RATE: 63 BPM | OXYGEN SATURATION: 97 % | WEIGHT: 131.38 LBS | DIASTOLIC BLOOD PRESSURE: 74 MMHG | SYSTOLIC BLOOD PRESSURE: 130 MMHG | TEMPERATURE: 98 F | BODY MASS INDEX: 24.83 KG/M2

## 2021-04-06 DIAGNOSIS — M81.0 AGE-RELATED OSTEOPOROSIS WITHOUT CURRENT PATHOLOGICAL FRACTURE: ICD-10-CM

## 2021-04-06 DIAGNOSIS — I25.10 CORONARY ARTERY DISEASE INVOLVING NATIVE CORONARY ARTERY OF NATIVE HEART WITHOUT ANGINA PECTORIS: ICD-10-CM

## 2021-04-06 DIAGNOSIS — M43.9 COMPRESSION DEFORMITY OF VERTEBRA: ICD-10-CM

## 2021-04-06 DIAGNOSIS — Z00.00 ROUTINE GENERAL MEDICAL EXAMINATION AT A HEALTH CARE FACILITY: Primary | ICD-10-CM

## 2021-04-06 DIAGNOSIS — E78.2 MIXED HYPERLIPIDEMIA: ICD-10-CM

## 2021-04-06 DIAGNOSIS — I10 ESSENTIAL HYPERTENSION: ICD-10-CM

## 2021-04-06 DIAGNOSIS — Z00.00 ROUTINE GENERAL MEDICAL EXAMINATION AT A HEALTH CARE FACILITY: ICD-10-CM

## 2021-04-06 DIAGNOSIS — I70.1 LEFT RENAL ARTERY STENOSIS: ICD-10-CM

## 2021-04-06 DIAGNOSIS — Z85.3 HISTORY OF LEFT BREAST CANCER: ICD-10-CM

## 2021-04-06 DIAGNOSIS — I65.21 OCCLUSION AND STENOSIS OF RIGHT CAROTID ARTERY: ICD-10-CM

## 2021-04-06 DIAGNOSIS — I70.0 ATHEROSCLEROSIS OF ABDOMINAL AORTA: ICD-10-CM

## 2021-04-06 DIAGNOSIS — R31.21 ASYMPTOMATIC MICROSCOPIC HEMATURIA: ICD-10-CM

## 2021-04-06 DIAGNOSIS — F41.1 GAD (GENERALIZED ANXIETY DISORDER): ICD-10-CM

## 2021-04-06 LAB
BASOPHILS # BLD AUTO: 0.05 K/UL (ref 0–0.2)
BASOPHILS NFR BLD: 0.7 % (ref 0–1.9)
DIFFERENTIAL METHOD: ABNORMAL
EOSINOPHIL # BLD AUTO: 0.4 K/UL (ref 0–0.5)
EOSINOPHIL NFR BLD: 5.2 % (ref 0–8)
ERYTHROCYTE [DISTWIDTH] IN BLOOD BY AUTOMATED COUNT: 13.8 % (ref 11.5–14.5)
HCT VFR BLD AUTO: 41.1 % (ref 37–48.5)
HGB BLD-MCNC: 12.7 G/DL (ref 12–16)
IMM GRANULOCYTES # BLD AUTO: 0.02 K/UL (ref 0–0.04)
IMM GRANULOCYTES NFR BLD AUTO: 0.3 % (ref 0–0.5)
LYMPHOCYTES # BLD AUTO: 2 K/UL (ref 1–4.8)
LYMPHOCYTES NFR BLD: 30 % (ref 18–48)
MCH RBC QN AUTO: 31.1 PG (ref 27–31)
MCHC RBC AUTO-ENTMCNC: 30.9 G/DL (ref 32–36)
MCV RBC AUTO: 101 FL (ref 82–98)
MONOCYTES # BLD AUTO: 0.8 K/UL (ref 0.3–1)
MONOCYTES NFR BLD: 11.7 % (ref 4–15)
NEUTROPHILS # BLD AUTO: 3.5 K/UL (ref 1.8–7.7)
NEUTROPHILS NFR BLD: 52.1 % (ref 38–73)
NRBC BLD-RTO: 0 /100 WBC
PLATELET # BLD AUTO: 255 K/UL (ref 150–450)
PMV BLD AUTO: 10 FL (ref 9.2–12.9)
RBC # BLD AUTO: 4.09 M/UL (ref 4–5.4)
WBC # BLD AUTO: 6.69 K/UL (ref 3.9–12.7)

## 2021-04-06 PROCEDURE — 99214 OFFICE O/P EST MOD 30 MIN: CPT | Mod: S$GLB,,, | Performed by: FAMILY MEDICINE

## 2021-04-06 PROCEDURE — 1126F PR PAIN SEVERITY QUANTIFIED, NO PAIN PRESENT: ICD-10-PCS | Mod: S$GLB,,, | Performed by: FAMILY MEDICINE

## 2021-04-06 PROCEDURE — 99214 PR OFFICE/OUTPT VISIT, EST, LEVL IV, 30-39 MIN: ICD-10-PCS | Mod: S$GLB,,, | Performed by: FAMILY MEDICINE

## 2021-04-06 PROCEDURE — 1101F PR PT FALLS ASSESS DOC 0-1 FALLS W/OUT INJ PAST YR: ICD-10-PCS | Mod: CPTII,S$GLB,, | Performed by: FAMILY MEDICINE

## 2021-04-06 PROCEDURE — 3288F PR FALLS RISK ASSESSMENT DOCUMENTED: ICD-10-PCS | Mod: CPTII,S$GLB,, | Performed by: FAMILY MEDICINE

## 2021-04-06 PROCEDURE — 3078F DIAST BP <80 MM HG: CPT | Mod: CPTII,S$GLB,, | Performed by: FAMILY MEDICINE

## 2021-04-06 PROCEDURE — 3075F PR MOST RECENT SYSTOLIC BLOOD PRESS GE 130-139MM HG: ICD-10-PCS | Mod: CPTII,S$GLB,, | Performed by: FAMILY MEDICINE

## 2021-04-06 PROCEDURE — 3075F SYST BP GE 130 - 139MM HG: CPT | Mod: CPTII,S$GLB,, | Performed by: FAMILY MEDICINE

## 2021-04-06 PROCEDURE — 3078F PR MOST RECENT DIASTOLIC BLOOD PRESSURE < 80 MM HG: ICD-10-PCS | Mod: CPTII,S$GLB,, | Performed by: FAMILY MEDICINE

## 2021-04-06 PROCEDURE — 36415 COLL VENOUS BLD VENIPUNCTURE: CPT | Performed by: FAMILY MEDICINE

## 2021-04-06 PROCEDURE — 99999 PR PBB SHADOW E&M-EST. PATIENT-LVL IV: CPT | Mod: PBBFAC,,, | Performed by: FAMILY MEDICINE

## 2021-04-06 PROCEDURE — 80053 COMPREHEN METABOLIC PANEL: CPT | Performed by: FAMILY MEDICINE

## 2021-04-06 PROCEDURE — 80061 LIPID PANEL: CPT | Performed by: FAMILY MEDICINE

## 2021-04-06 PROCEDURE — 1101F PT FALLS ASSESS-DOCD LE1/YR: CPT | Mod: CPTII,S$GLB,, | Performed by: FAMILY MEDICINE

## 2021-04-06 PROCEDURE — 85025 COMPLETE CBC W/AUTO DIFF WBC: CPT | Performed by: FAMILY MEDICINE

## 2021-04-06 PROCEDURE — 3288F FALL RISK ASSESSMENT DOCD: CPT | Mod: CPTII,S$GLB,, | Performed by: FAMILY MEDICINE

## 2021-04-06 PROCEDURE — 1126F AMNT PAIN NOTED NONE PRSNT: CPT | Mod: S$GLB,,, | Performed by: FAMILY MEDICINE

## 2021-04-06 PROCEDURE — 99999 PR PBB SHADOW E&M-EST. PATIENT-LVL IV: ICD-10-PCS | Mod: PBBFAC,,, | Performed by: FAMILY MEDICINE

## 2021-04-06 PROCEDURE — 84443 ASSAY THYROID STIM HORMONE: CPT | Performed by: FAMILY MEDICINE

## 2021-04-06 RX ORDER — ALENDRONATE SODIUM 70 MG/1
70 TABLET ORAL
Qty: 4 TABLET | Refills: 11 | Status: SHIPPED | OUTPATIENT
Start: 2021-04-06 | End: 2021-11-18

## 2021-04-06 RX ORDER — CARVEDILOL 12.5 MG/1
TABLET ORAL
COMMUNITY
Start: 2021-01-18

## 2021-04-07 LAB
ALBUMIN SERPL BCP-MCNC: 3.9 G/DL (ref 3.5–5.2)
ALP SERPL-CCNC: 60 U/L (ref 55–135)
ALT SERPL W/O P-5'-P-CCNC: 24 U/L (ref 10–44)
ANION GAP SERPL CALC-SCNC: 10 MMOL/L (ref 8–16)
AST SERPL-CCNC: 33 U/L (ref 10–40)
BILIRUB SERPL-MCNC: 0.6 MG/DL (ref 0.1–1)
BUN SERPL-MCNC: 12 MG/DL (ref 8–23)
CALCIUM SERPL-MCNC: 9.7 MG/DL (ref 8.7–10.5)
CHLORIDE SERPL-SCNC: 104 MMOL/L (ref 95–110)
CHOLEST SERPL-MCNC: 150 MG/DL (ref 120–199)
CHOLEST/HDLC SERPL: 2.6 {RATIO} (ref 2–5)
CO2 SERPL-SCNC: 26 MMOL/L (ref 23–29)
CREAT SERPL-MCNC: 0.7 MG/DL (ref 0.5–1.4)
EST. GFR  (AFRICAN AMERICAN): >60 ML/MIN/1.73 M^2
EST. GFR  (NON AFRICAN AMERICAN): >60 ML/MIN/1.73 M^2
GLUCOSE SERPL-MCNC: 93 MG/DL (ref 70–110)
HDLC SERPL-MCNC: 57 MG/DL (ref 40–75)
HDLC SERPL: 38 % (ref 20–50)
LDLC SERPL CALC-MCNC: 76.8 MG/DL (ref 63–159)
NONHDLC SERPL-MCNC: 93 MG/DL
POTASSIUM SERPL-SCNC: 4.5 MMOL/L (ref 3.5–5.1)
PROT SERPL-MCNC: 7.2 G/DL (ref 6–8.4)
SODIUM SERPL-SCNC: 140 MMOL/L (ref 136–145)
TRIGL SERPL-MCNC: 81 MG/DL (ref 30–150)
TSH SERPL DL<=0.005 MIU/L-ACNC: 1.26 UIU/ML (ref 0.4–4)

## 2021-05-10 ENCOUNTER — TELEPHONE (OUTPATIENT)
Dept: RADIOLOGY | Facility: HOSPITAL | Age: 81
End: 2021-05-10

## 2021-05-11 ENCOUNTER — HOSPITAL ENCOUNTER (OUTPATIENT)
Dept: RADIOLOGY | Facility: HOSPITAL | Age: 81
Discharge: HOME | End: 2021-05-11
Attending: FAMILY MEDICINE
Payer: MEDICARE

## 2021-05-11 DIAGNOSIS — I70.1 LEFT RENAL ARTERY STENOSIS: ICD-10-CM

## 2021-05-11 PROCEDURE — 93975 VASCULAR STUDY: CPT | Mod: TC

## 2021-05-11 PROCEDURE — 93975 VASCULAR STUDY: CPT | Mod: 26,,, | Performed by: RADIOLOGY

## 2021-05-11 PROCEDURE — 93975 US RENAL ARTERY STENOSIS HYPERTEN (XPD): ICD-10-PCS | Mod: 26,,, | Performed by: RADIOLOGY

## 2021-07-01 ENCOUNTER — PATIENT MESSAGE (OUTPATIENT)
Dept: ADMINISTRATIVE | Facility: OTHER | Age: 81
End: 2021-07-01

## 2021-09-02 ENCOUNTER — TELEPHONE (OUTPATIENT)
Dept: INTERNAL MEDICINE | Facility: CLINIC | Age: 81
End: 2021-09-02

## 2021-09-02 DIAGNOSIS — Z12.31 ENCOUNTER FOR SCREENING MAMMOGRAM FOR MALIGNANT NEOPLASM OF BREAST: Primary | ICD-10-CM

## 2021-09-27 ENCOUNTER — IMMUNIZATION (OUTPATIENT)
Dept: INTERNAL MEDICINE | Facility: CLINIC | Age: 81
End: 2021-09-27
Payer: MEDICARE

## 2021-09-27 PROCEDURE — G0008 FLU VACCINE - QUADRIVALENT - ADJUVANTED: ICD-10-PCS | Mod: S$GLB,,, | Performed by: INTERNAL MEDICINE

## 2021-09-27 PROCEDURE — G0008 ADMIN INFLUENZA VIRUS VAC: HCPCS | Mod: S$GLB,,, | Performed by: INTERNAL MEDICINE

## 2021-09-27 PROCEDURE — 90694 FLU VACCINE - QUADRIVALENT - ADJUVANTED: ICD-10-PCS | Mod: S$GLB,,, | Performed by: INTERNAL MEDICINE

## 2021-09-27 PROCEDURE — 90694 VACC AIIV4 NO PRSRV 0.5ML IM: CPT | Mod: S$GLB,,, | Performed by: INTERNAL MEDICINE

## 2021-10-15 ENCOUNTER — IMMUNIZATION (OUTPATIENT)
Dept: PRIMARY CARE CLINIC | Facility: CLINIC | Age: 81
End: 2021-10-15
Payer: MEDICARE

## 2021-10-15 DIAGNOSIS — Z23 NEED FOR VACCINATION: Primary | ICD-10-CM

## 2021-10-15 PROCEDURE — 91300 COVID-19, MRNA, LNP-S, PF, 30 MCG/0.3 ML DOSE VACCINE: CPT | Mod: PBBFAC | Performed by: FAMILY MEDICINE

## 2021-10-15 PROCEDURE — 0003A COVID-19, MRNA, LNP-S, PF, 30 MCG/0.3 ML DOSE VACCINE: CPT | Mod: CV19,PBBFAC | Performed by: FAMILY MEDICINE

## 2021-10-20 ENCOUNTER — HOSPITAL ENCOUNTER (OUTPATIENT)
Dept: RADIOLOGY | Facility: HOSPITAL | Age: 81
Discharge: HOME | End: 2021-10-20
Attending: FAMILY MEDICINE
Payer: MEDICARE

## 2021-10-20 DIAGNOSIS — Z12.31 ENCOUNTER FOR SCREENING MAMMOGRAM FOR MALIGNANT NEOPLASM OF BREAST: ICD-10-CM

## 2021-10-20 PROCEDURE — 77067 MAMMO DIGITAL SCREENING RIGHT WITH TOMO: ICD-10-PCS | Mod: 26,,, | Performed by: RADIOLOGY

## 2021-10-20 PROCEDURE — 77063 MAMMO DIGITAL SCREENING RIGHT WITH TOMO: ICD-10-PCS | Mod: 26,,, | Performed by: RADIOLOGY

## 2021-10-20 PROCEDURE — 77067 SCR MAMMO BI INCL CAD: CPT | Mod: 26,,, | Performed by: RADIOLOGY

## 2021-10-20 PROCEDURE — 77067 SCR MAMMO BI INCL CAD: CPT | Mod: TC

## 2021-10-20 PROCEDURE — 77063 BREAST TOMOSYNTHESIS BI: CPT | Mod: 26,,, | Performed by: RADIOLOGY

## 2021-11-18 ENCOUNTER — OFFICE VISIT (OUTPATIENT)
Dept: INTERNAL MEDICINE | Facility: CLINIC | Age: 81
End: 2021-11-18
Payer: MEDICARE

## 2021-11-18 VITALS
TEMPERATURE: 98 F | SYSTOLIC BLOOD PRESSURE: 130 MMHG | DIASTOLIC BLOOD PRESSURE: 64 MMHG | HEART RATE: 65 BPM | OXYGEN SATURATION: 98 % | WEIGHT: 132.5 LBS | BODY MASS INDEX: 25.03 KG/M2

## 2021-11-18 DIAGNOSIS — I70.1 LEFT RENAL ARTERY STENOSIS: ICD-10-CM

## 2021-11-18 DIAGNOSIS — E78.2 MIXED HYPERLIPIDEMIA: ICD-10-CM

## 2021-11-18 DIAGNOSIS — I25.10 CORONARY ARTERY DISEASE INVOLVING NATIVE CORONARY ARTERY OF NATIVE HEART WITHOUT ANGINA PECTORIS: ICD-10-CM

## 2021-11-18 DIAGNOSIS — F41.1 GAD (GENERALIZED ANXIETY DISORDER): ICD-10-CM

## 2021-11-18 DIAGNOSIS — Z85.3 HISTORY OF LEFT BREAST CANCER: ICD-10-CM

## 2021-11-18 DIAGNOSIS — I10 ESSENTIAL HYPERTENSION: Primary | ICD-10-CM

## 2021-11-18 DIAGNOSIS — M81.0 AGE-RELATED OSTEOPOROSIS WITHOUT CURRENT PATHOLOGICAL FRACTURE: ICD-10-CM

## 2021-11-18 DIAGNOSIS — M43.9 COMPRESSION DEFORMITY OF VERTEBRA: ICD-10-CM

## 2021-11-18 DIAGNOSIS — I70.0 ATHEROSCLEROSIS OF ABDOMINAL AORTA: ICD-10-CM

## 2021-11-18 DIAGNOSIS — R31.21 ASYMPTOMATIC MICROSCOPIC HEMATURIA: ICD-10-CM

## 2021-11-18 LAB
ALBUMIN SERPL BCP-MCNC: 3.8 G/DL (ref 3.5–5.2)
ALP SERPL-CCNC: 60 U/L (ref 55–135)
ALT SERPL W/O P-5'-P-CCNC: 17 U/L (ref 10–44)
ANION GAP SERPL CALC-SCNC: 8 MMOL/L (ref 8–16)
AST SERPL-CCNC: 23 U/L (ref 10–40)
BILIRUB SERPL-MCNC: 0.5 MG/DL (ref 0.1–1)
BUN SERPL-MCNC: 12 MG/DL (ref 8–23)
CALCIUM SERPL-MCNC: 9.6 MG/DL (ref 8.7–10.5)
CHLORIDE SERPL-SCNC: 105 MMOL/L (ref 95–110)
CHOLEST SERPL-MCNC: 149 MG/DL (ref 120–199)
CHOLEST/HDLC SERPL: 2.7 {RATIO} (ref 2–5)
CO2 SERPL-SCNC: 27 MMOL/L (ref 23–29)
CREAT SERPL-MCNC: 0.7 MG/DL (ref 0.5–1.4)
EST. GFR  (AFRICAN AMERICAN): >60 ML/MIN/1.73 M^2
EST. GFR  (NON AFRICAN AMERICAN): >60 ML/MIN/1.73 M^2
GLUCOSE SERPL-MCNC: 98 MG/DL (ref 70–110)
HDLC SERPL-MCNC: 56 MG/DL (ref 40–75)
HDLC SERPL: 37.6 % (ref 20–50)
LDLC SERPL CALC-MCNC: 75.6 MG/DL (ref 63–159)
NONHDLC SERPL-MCNC: 93 MG/DL
POTASSIUM SERPL-SCNC: 4.3 MMOL/L (ref 3.5–5.1)
PROT SERPL-MCNC: 7.2 G/DL (ref 6–8.4)
SODIUM SERPL-SCNC: 140 MMOL/L (ref 136–145)
TRIGL SERPL-MCNC: 87 MG/DL (ref 30–150)
TSH SERPL DL<=0.005 MIU/L-ACNC: 2.6 UIU/ML (ref 0.4–4)

## 2021-11-18 PROCEDURE — 3075F PR MOST RECENT SYSTOLIC BLOOD PRESS GE 130-139MM HG: ICD-10-PCS | Mod: CPTII,S$GLB,, | Performed by: FAMILY MEDICINE

## 2021-11-18 PROCEDURE — 1101F PR PT FALLS ASSESS DOC 0-1 FALLS W/OUT INJ PAST YR: ICD-10-PCS | Mod: CPTII,S$GLB,, | Performed by: FAMILY MEDICINE

## 2021-11-18 PROCEDURE — 1159F MED LIST DOCD IN RCRD: CPT | Mod: CPTII,S$GLB,, | Performed by: FAMILY MEDICINE

## 2021-11-18 PROCEDURE — 1160F PR REVIEW ALL MEDS BY PRESCRIBER/CLIN PHARMACIST DOCUMENTED: ICD-10-PCS | Mod: CPTII,S$GLB,, | Performed by: FAMILY MEDICINE

## 2021-11-18 PROCEDURE — 3288F PR FALLS RISK ASSESSMENT DOCUMENTED: ICD-10-PCS | Mod: CPTII,S$GLB,, | Performed by: FAMILY MEDICINE

## 2021-11-18 PROCEDURE — 99214 PR OFFICE/OUTPT VISIT, EST, LEVL IV, 30-39 MIN: ICD-10-PCS | Mod: S$GLB,,, | Performed by: FAMILY MEDICINE

## 2021-11-18 PROCEDURE — 3288F FALL RISK ASSESSMENT DOCD: CPT | Mod: CPTII,S$GLB,, | Performed by: FAMILY MEDICINE

## 2021-11-18 PROCEDURE — 99999 PR PBB SHADOW E&M-EST. PATIENT-LVL IV: CPT | Mod: PBBFAC,,, | Performed by: FAMILY MEDICINE

## 2021-11-18 PROCEDURE — 1126F AMNT PAIN NOTED NONE PRSNT: CPT | Mod: CPTII,S$GLB,, | Performed by: FAMILY MEDICINE

## 2021-11-18 PROCEDURE — 80053 COMPREHEN METABOLIC PANEL: CPT | Performed by: FAMILY MEDICINE

## 2021-11-18 PROCEDURE — 1101F PT FALLS ASSESS-DOCD LE1/YR: CPT | Mod: CPTII,S$GLB,, | Performed by: FAMILY MEDICINE

## 2021-11-18 PROCEDURE — 3078F DIAST BP <80 MM HG: CPT | Mod: CPTII,S$GLB,, | Performed by: FAMILY MEDICINE

## 2021-11-18 PROCEDURE — 99999 PR PBB SHADOW E&M-EST. PATIENT-LVL IV: ICD-10-PCS | Mod: PBBFAC,,, | Performed by: FAMILY MEDICINE

## 2021-11-18 PROCEDURE — 84443 ASSAY THYROID STIM HORMONE: CPT | Performed by: FAMILY MEDICINE

## 2021-11-18 PROCEDURE — 80061 LIPID PANEL: CPT | Performed by: FAMILY MEDICINE

## 2021-11-18 PROCEDURE — 3078F PR MOST RECENT DIASTOLIC BLOOD PRESSURE < 80 MM HG: ICD-10-PCS | Mod: CPTII,S$GLB,, | Performed by: FAMILY MEDICINE

## 2021-11-18 PROCEDURE — 1160F RVW MEDS BY RX/DR IN RCRD: CPT | Mod: CPTII,S$GLB,, | Performed by: FAMILY MEDICINE

## 2021-11-18 PROCEDURE — 1159F PR MEDICATION LIST DOCUMENTED IN MEDICAL RECORD: ICD-10-PCS | Mod: CPTII,S$GLB,, | Performed by: FAMILY MEDICINE

## 2021-11-18 PROCEDURE — 1126F PR PAIN SEVERITY QUANTIFIED, NO PAIN PRESENT: ICD-10-PCS | Mod: CPTII,S$GLB,, | Performed by: FAMILY MEDICINE

## 2021-11-18 PROCEDURE — 3075F SYST BP GE 130 - 139MM HG: CPT | Mod: CPTII,S$GLB,, | Performed by: FAMILY MEDICINE

## 2021-11-18 PROCEDURE — 99214 OFFICE O/P EST MOD 30 MIN: CPT | Mod: S$GLB,,, | Performed by: FAMILY MEDICINE

## 2021-12-23 ENCOUNTER — TELEPHONE (OUTPATIENT)
Dept: INTERNAL MEDICINE | Facility: CLINIC | Age: 81
End: 2021-12-23
Payer: MEDICARE

## 2022-04-22 DIAGNOSIS — F41.1 GAD (GENERALIZED ANXIETY DISORDER): ICD-10-CM

## 2022-04-22 RX ORDER — CITALOPRAM 10 MG/1
10 TABLET ORAL DAILY
Qty: 90 TABLET | Refills: 1 | Status: SHIPPED | OUTPATIENT
Start: 2022-04-22

## 2022-04-22 NOTE — TELEPHONE ENCOUNTER
----- Message from Nisha Carlos sent at 4/22/2022  1:59 PM CDT -----  Regarding: refill  Contact: patient  Type:  RX Refill Request    Who Called:   Refill or New Rx:  RX Name and Strength:Citalopram-10mg  How is the patient currently taking it? (ex. 1XDay):once daily  Is this a 30 day or 90 day RX:90  [facility] phone number[facility]  Local or Mail Order:local  Ordering Provider:Dr Hernandez  Would the patient rather a call back or a response via [facility]? call  Best Call Back Number:[phone]         Additional Information:   ]

## 2022-04-22 NOTE — TELEPHONE ENCOUNTER
No new care gaps identified.  Powered by [employer]. Reference number: [id number].   4/22/2022 4:22:17 PM CDT

## 2022-09-21 ENCOUNTER — TELEPHONE (OUTPATIENT)
Dept: INTERNAL MEDICINE | Facility: CLINIC | Age: 82
End: 2022-09-21
Payer: MEDICARE

## 2022-09-21 DIAGNOSIS — Z12.31 ENCOUNTER FOR SCREENING MAMMOGRAM FOR MALIGNANT NEOPLASM OF BREAST: Primary | ICD-10-CM

## 2022-09-21 NOTE — TELEPHONE ENCOUNTER
----- Message from Luana Hamlin sent at 9/21/2022  1:02 PM CDT -----  .Type:  Mammogram    Caller is requesting to schedule their annual mammogram appointment.  Order is not listed in EPIC.  Please enter order and contact patient to schedule.  Name of Caller:  [facility] they [facility]   Would the patient rather a call back or a response via [facility]? Call [facility]   Best Call Back Number:.[phone]    Additional Information: pt would like order for her mammo for late [place]     Thanks c

## 2022-09-21 NOTE — TELEPHONE ENCOUNTER
----- Message from Luana Hamlin sent at 9/21/2022  1:02 PM CDT -----  .Type:  Mammogram    Caller is requesting to schedule their annual mammogram appointment.  Order is not listed in EPIC.  Please enter order and contact patient to schedule.  Name of Caller:  Where would they [facility] the [facility]ogram [facility]   Would the patient rather a call back or a response via [facility]? Call [facility]   Best Call Back Number:.[phone]    Additional Information: pt would like order for her mammo for late [place]     Thanks c

## 2022-10-21 ENCOUNTER — HOSPITAL ENCOUNTER (OUTPATIENT)
Dept: RADIOLOGY | Facility: HOSPITAL | Age: 82
Discharge: HOME | End: 2022-10-21
Attending: FAMILY MEDICINE
Payer: MEDICARE

## 2022-10-21 VITALS — BODY MASS INDEX: 25.02 KG/M2 | WEIGHT: 132.5 LBS | HEIGHT: 61 IN

## 2022-10-21 DIAGNOSIS — Z12.31 ENCOUNTER FOR SCREENING MAMMOGRAM FOR MALIGNANT NEOPLASM OF BREAST: ICD-10-CM

## 2022-10-21 PROCEDURE — 77063 MAMMO DIGITAL SCREENING RIGHT WITH TOMO: ICD-10-PCS | Mod: 26,,, | Performed by: RADIOLOGY

## 2022-10-21 PROCEDURE — 77063 BREAST TOMOSYNTHESIS BI: CPT | Mod: 26,,, | Performed by: RADIOLOGY

## 2022-10-21 PROCEDURE — 77067 SCR MAMMO BI INCL CAD: CPT | Mod: 26,,, | Performed by: RADIOLOGY

## 2022-10-21 PROCEDURE — 77067 MAMMO DIGITAL SCREENING RIGHT WITH TOMO: ICD-10-PCS | Mod: 26,,, | Performed by: RADIOLOGY

## 2022-10-21 PROCEDURE — 77063 BREAST TOMOSYNTHESIS BI: CPT | Mod: TC

## 2022-10-27 ENCOUNTER — TELEPHONE (OUTPATIENT)
Dept: INTERNAL MEDICINE | Facility: CLINIC | Age: 82
End: 2022-10-27
Payer: MEDICARE

## 2022-10-27 NOTE — TELEPHONE ENCOUNTER
----- Message from Ila Harrington sent at 10/27/2022  4:26 PM CDT -----  Contact: [phone]  Patient would like to consult with a nurse in regards to her mammo results she had on 10/21. She stated she would also have the results mailed to her home. Please give her a call back at [phone]. Thanks jessica

## 2022-10-28 ENCOUNTER — PATIENT OUTREACH (OUTPATIENT)
Dept: ADMINISTRATIVE | Facility: HOSPITAL | Age: 82
End: 2022-10-28
Payer: MEDICARE

## 2022-10-28 NOTE — PROGRESS NOTES
HTN Report:  Called pt to offer follow up appointment pt says she does NOT drive to [place] anymore and she will start seeing a MD in her area.  Care Teams updated

## 2023-09-28 ENCOUNTER — IMMUNIZATION (OUTPATIENT)
Dept: INTERNAL MEDICINE | Facility: CLINIC | Age: 83
End: 2023-09-28
Payer: MEDICARE

## 2023-09-28 PROCEDURE — G0008 ADMIN INFLUENZA VIRUS VAC: HCPCS | Mod: S$GLB,,, | Performed by: INTERNAL MEDICINE

## 2023-09-28 PROCEDURE — 90694 VACC AIIV4 NO PRSRV 0.5ML IM: CPT | Mod: S$GLB,,, | Performed by: INTERNAL MEDICINE

## 2023-09-28 PROCEDURE — G0008 FLU VACCINE - QUADRIVALENT - ADJUVANTED: ICD-10-PCS | Mod: S$GLB,,, | Performed by: INTERNAL MEDICINE

## 2023-09-28 PROCEDURE — 90694 FLU VACCINE - QUADRIVALENT - ADJUVANTED: ICD-10-PCS | Mod: S$GLB,,, | Performed by: INTERNAL MEDICINE

## 2024-02-13 ENCOUNTER — HOSPITAL ENCOUNTER (OUTPATIENT)
Dept: RADIOLOGY | Facility: HOSPITAL | Age: 84
Discharge: HOME | End: 2024-02-13
Attending: PHYSICIAN ASSISTANT
Payer: MEDICARE

## 2024-02-13 DIAGNOSIS — Z12.31 SCREENING MAMMOGRAM FOR BREAST CANCER: ICD-10-CM

## 2024-02-13 PROCEDURE — 77067 SCR MAMMO BI INCL CAD: CPT | Mod: 26,52,, | Performed by: RADIOLOGY

## 2024-02-13 PROCEDURE — 77063 BREAST TOMOSYNTHESIS BI: CPT | Mod: 26,52,, | Performed by: RADIOLOGY

## 2024-02-13 PROCEDURE — 77067 SCR MAMMO BI INCL CAD: CPT | Mod: TC,52

## 2024-07-19 ENCOUNTER — OFFICE VISIT (OUTPATIENT)
Dept: INTERNAL MEDICINE | Facility: CLINIC | Age: 84
End: 2024-07-19
Payer: MEDICARE

## 2024-07-19 ENCOUNTER — HOSPITAL ENCOUNTER (OUTPATIENT)
Dept: RADIOLOGY | Facility: HOSPITAL | Age: 84
Discharge: HOME | End: 2024-07-19
Attending: FAMILY MEDICINE
Payer: MEDICARE

## 2024-07-19 VITALS
DIASTOLIC BLOOD PRESSURE: 67 MMHG | HEIGHT: 61 IN | TEMPERATURE: 95 F | BODY MASS INDEX: 23.65 KG/M2 | WEIGHT: 125.25 LBS | SYSTOLIC BLOOD PRESSURE: 130 MMHG | HEART RATE: 71 BPM | OXYGEN SATURATION: 97 %

## 2024-07-19 DIAGNOSIS — D50.9 IRON DEFICIENCY ANEMIA, UNSPECIFIED IRON DEFICIENCY ANEMIA TYPE: ICD-10-CM

## 2024-07-19 DIAGNOSIS — I25.10 CORONARY ARTERY DISEASE INVOLVING NATIVE CORONARY ARTERY OF NATIVE HEART WITHOUT ANGINA PECTORIS: ICD-10-CM

## 2024-07-19 DIAGNOSIS — Z00.00 ROUTINE GENERAL MEDICAL EXAMINATION AT A HEALTH CARE FACILITY: Primary | ICD-10-CM

## 2024-07-19 DIAGNOSIS — I70.1 LEFT RENAL ARTERY STENOSIS: ICD-10-CM

## 2024-07-19 DIAGNOSIS — M81.0 AGE-RELATED OSTEOPOROSIS WITHOUT CURRENT PATHOLOGICAL FRACTURE: ICD-10-CM

## 2024-07-19 DIAGNOSIS — Z79.899 OTHER LONG TERM (CURRENT) DRUG THERAPY: ICD-10-CM

## 2024-07-19 DIAGNOSIS — M25.511 CHRONIC RIGHT SHOULDER PAIN: ICD-10-CM

## 2024-07-19 DIAGNOSIS — E55.9 VITAMIN D DEFICIENCY, UNSPECIFIED: ICD-10-CM

## 2024-07-19 DIAGNOSIS — G89.29 CHRONIC RIGHT SHOULDER PAIN: ICD-10-CM

## 2024-07-19 DIAGNOSIS — Z85.3 HISTORY OF LEFT BREAST CANCER: ICD-10-CM

## 2024-07-19 DIAGNOSIS — F41.1 GAD (GENERALIZED ANXIETY DISORDER): ICD-10-CM

## 2024-07-19 DIAGNOSIS — I10 ESSENTIAL HYPERTENSION: ICD-10-CM

## 2024-07-19 DIAGNOSIS — I65.21 OCCLUSION AND STENOSIS OF RIGHT CAROTID ARTERY: ICD-10-CM

## 2024-07-19 DIAGNOSIS — E78.2 MIXED HYPERLIPIDEMIA: ICD-10-CM

## 2024-07-19 DIAGNOSIS — I70.0 ATHEROSCLEROSIS OF ABDOMINAL AORTA: ICD-10-CM

## 2024-07-19 DIAGNOSIS — M43.9 COMPRESSION DEFORMITY OF VERTEBRA: ICD-10-CM

## 2024-07-19 PROCEDURE — 3075F SYST BP GE 130 - 139MM HG: CPT | Mod: CPTII,S$GLB,, | Performed by: FAMILY MEDICINE

## 2024-07-19 PROCEDURE — 99214 OFFICE O/P EST MOD 30 MIN: CPT | Mod: 25,S$GLB,, | Performed by: FAMILY MEDICINE

## 2024-07-19 PROCEDURE — 99999 PR PBB SHADOW E&M-EST. PATIENT-LVL V: CPT | Mod: PBBFAC,,, | Performed by: FAMILY MEDICINE

## 2024-07-19 PROCEDURE — 73030 X-RAY EXAM OF SHOULDER: CPT | Mod: TC,RT

## 2024-07-19 PROCEDURE — 1160F RVW MEDS BY RX/DR IN RCRD: CPT | Mod: CPTII,S$GLB,, | Performed by: FAMILY MEDICINE

## 2024-07-19 PROCEDURE — 73030 X-RAY EXAM OF SHOULDER: CPT | Mod: 26,RT,, | Performed by: RADIOLOGY

## 2024-07-19 PROCEDURE — 1159F MED LIST DOCD IN RCRD: CPT | Mod: CPTII,S$GLB,, | Performed by: FAMILY MEDICINE

## 2024-07-19 PROCEDURE — 3078F DIAST BP <80 MM HG: CPT | Mod: CPTII,S$GLB,, | Performed by: FAMILY MEDICINE

## 2024-07-19 PROCEDURE — 1125F AMNT PAIN NOTED PAIN PRSNT: CPT | Mod: CPTII,S$GLB,, | Performed by: FAMILY MEDICINE

## 2024-07-19 PROCEDURE — 99397 PER PM REEVAL EST PAT 65+ YR: CPT | Mod: S$GLB,,, | Performed by: FAMILY MEDICINE

## 2024-07-19 NOTE — PROGRESS NOTES
Subjective:       Patient ID: Aicha Rolle is a 84 y.o. female.    Chief Complaint: Establish Care (Pt present today to re-establish care with c/o rt shoulder pain, requesting referral to ENT  to address pressure in ears )    84-year-old female patient here to reestablish care.  Patient with Patient Active Problem List:     Essential hypertension     Mixed hyperlipidemia     Coronary artery disease     Osteoporosis     Diverticulosis of colon (without mention of hemorrhage)     History of left breast cancer     Left renal artery stenosis     Atherosclerosis of abdominal aorta     Compression deformity of vertebra     Asymptomatic microscopic hematuria     Occlusion and stenosis of right carotid artery     DORIS (generalized anxiety disorder)  Here for routine annual physicals, reports that her daughter-in-law passed away secondary to heart attack few months ago but has been handling well  Has been taking her medications regularly and has been followed by cardiologist at [facility]  Patient reports right shoulder pain off and on lately and went to outside urgent care, has been taking extra-strength Tylenol with some relief  Daughter-in-law had shoulder pain which internally radiated to the chest and caused heart attack and she wanted to check get checked, reports joint stiffness early in the morning  Has been having pressure behind the ears and would like to get checked  Patient had bone density scan showing osteoporosis but has been taking over-the-counter vitamin-D supplements        Review of Systems   Constitutional:  Negative for fatigue.   HENT:  Positive for congestion.    Eyes:  Negative for visual disturbance.   Respiratory:  Negative for shortness of breath.    Cardiovascular:  Negative for chest pain and leg swelling.   Gastrointestinal:  Negative for abdominal pain, nausea and vomiting.   Musculoskeletal:  Positive for arthralgias. Negative for back pain and myalgias.   Skin:  Negative for rash.   Neurological:   "Negative for dizziness, light-headedness, numbness and headaches.   Psychiatric/Behavioral:  Negative for dysphoric mood and sleep disturbance. The patient is not nervous/anxious.          /67 (BP Location: Right arm, Patient Position: Sitting, BP Method: Small (Manual))   Pulse 71   Temp (!) 94.7 °F (34.8 °C) (Tympanic)   Ht 5' 1" (1.549 m)   Wt 56.8 kg (125 lb 3.5 oz)   SpO2 97%   BMI 23.66 kg/m²   Objective:      Physical Exam  Constitutional:       Appearance: She is well-developed.   HENT:      Head: Normocephalic and atraumatic.      Right Ear: Tympanic membrane, ear canal and external ear normal. There is no impacted cerumen.      Left Ear: Tympanic membrane, ear canal and external ear normal. There is no impacted cerumen.      Ears:      Comments: Patient wearing hearing aids  Cardiovascular:      Rate and Rhythm: Normal rate and regular rhythm.      Heart sounds: Normal heart sounds. No murmur heard.  Pulmonary:      Effort: Pulmonary effort is normal.      Breath sounds: Normal breath sounds. No wheezing.   Abdominal:      General: Bowel sounds are normal.      Palpations: Abdomen is soft.      Tenderness: There is no abdominal tenderness.   Musculoskeletal:         General: Tenderness present.      Comments: Positive for right shoulder tenderness but no restricted range of motion with extension or abduction  No cervical spinal tenderness noted on exam today   Skin:     General: Skin is warm and dry.      Findings: No rash.   Neurological:      Mental Status: She is alert and oriented to person, place, and time.   Psychiatric:         Mood and Affect: Mood normal.           Assessment/Plan:   1. Routine general medical examination at a health care facility  -     Urinalysis, Reflex to Urine Culture Urine, Clean Catch; Future; Expected date: 07/19/2024  -     Hemoglobin A1C; Future; Expected date: 07/19/2024  -     TSH; Future; Expected date: 07/19/2024  -     Lipid Panel; Future; Expected date: " 07/19/2024  -     Comprehensive Metabolic Panel; Future; Expected date: 07/19/2024  -     CBC Auto Differential; Future; Expected date: 07/19/2024  -     Vitamin D; Future; Expected date: 07/19/2024  Vital signs stable today.  Clinical exam stable  Continue lifestyle modifications with low-fat and low-cholesterol diet and exercise 30 minutes daily  Encouraged to consider getting RSV and shingles vaccination    2. Essential hypertension  -     Urinalysis, Reflex to Urine Culture Urine, Clean Catch; Future; Expected date: 07/19/2024  -     TSH; Future; Expected date: 07/19/2024  -     Lipid Panel; Future; Expected date: 07/19/2024  -     Comprehensive Metabolic Panel; Future; Expected date: 07/19/2024  Blood pressure is stable currently on amlodipine 5 mg, carvedilol 12.5 mg twice daily, clonidine 0.1 mg patch weekly    3. Mixed hyperlipidemia  -     Lipid Panel; Future; Expected date: 07/19/2024  Currently taking Lipitor 80 mg daily    4. Coronary artery disease involving native coronary artery of native heart without angina pectoris  Overview:  s/p cardiac stents Dr Cavazos- [facility]  Stable and asymptomatic followed by Cardiology    5. DORIS (generalized anxiety disorder)  Clinically doing well without taking any medication    6. Occlusion and stenosis of right carotid artery  Followed by Cardiology currently on Plavix and statins    7. Compression deformity of vertebra  Overview:  Dr Ej Hamlin s/p ALFREDO  Stable and asymptomatic    8. Left renal artery stenosis  Overview:  Last Assessment & Plan:   Stable continue clopidogrel and atorvastatin therapy    Orders:  -     CBC Auto Differential; Future; Expected date: 07/19/2024  Stable followed by Cardiology    9. History of left breast cancer  Overview:  Left Breast Cancer      10. Atherosclerosis of abdominal aorta  Continue statins    11. Age-related osteoporosis without current pathological fracture  -     DXA Bone Density Axial Skeleton 1 or more sites; Future;  Expected date: 07/19/2024  Will plan to repeat DEXA scan and if it is still showing osteoporosis Will refer to rheumatology for infusions  Continue calcium with vitamin-D supplements    12. Chronic right shoulder pain  -     CBC Auto Differential; Future; Expected date: 07/19/2024  -     X-ray Shoulder 2 or More Views Right; Future; Expected date: 07/19/2024  -     Vitamin D; Future; Expected date: 07/19/2024  Will get x-ray of the right shoulder to look into further etiology, could be secondary to underlying arthritis  Encouraged to take extra-strength Tylenol as needed for pain    13. Other long term (current) drug therapy  -     Hemoglobin A1C; Future; Expected date: 07/19/2024    14. Vitamin D deficiency, unspecified  -     Vitamin D; Future; Expected date: 07/19/2024  Will check vitamin-D levels    15. Iron deficiency anemia, unspecified iron deficiency anemia type  -     Iron and TIBC; Future; Expected date: 07/19/2024  -     Ferritin; Future; Expected date: 07/19/2024  Patient was informed that urgent care that she has anemia, does not report any tarry stools  Was advised to start taking low dose of iron supplements which patient has been taking it over-the-counter

## 2024-07-30 ENCOUNTER — APPOINTMENT (OUTPATIENT)
Dept: RADIOLOGY | Facility: HOSPITAL | Age: 84
End: 2024-07-30
Attending: FAMILY MEDICINE
Payer: MEDICARE

## 2024-07-30 DIAGNOSIS — M81.0 AGE-RELATED OSTEOPOROSIS WITHOUT CURRENT PATHOLOGICAL FRACTURE: ICD-10-CM

## 2024-07-30 PROCEDURE — 77080 DXA BONE DENSITY AXIAL: CPT | Mod: TC

## 2024-07-30 PROCEDURE — 77080 DXA BONE DENSITY AXIAL: CPT | Mod: 26,,, | Performed by: RADIOLOGY

## 2024-10-17 ENCOUNTER — IMMUNIZATION (OUTPATIENT)
Dept: INTERNAL MEDICINE | Facility: CLINIC | Age: 84
End: 2024-10-17
Payer: MEDICARE

## 2024-10-17 DIAGNOSIS — Z23 NEED FOR VACCINATION: Primary | ICD-10-CM

## 2024-10-17 PROCEDURE — 90653 IIV ADJUVANT VACCINE IM: CPT | Mod: S$GLB,,, | Performed by: INTERNAL MEDICINE

## 2024-10-17 PROCEDURE — G0008 ADMIN INFLUENZA VIRUS VAC: HCPCS | Mod: S$GLB,,, | Performed by: INTERNAL MEDICINE

## 2025-02-17 ENCOUNTER — HOSPITAL ENCOUNTER (OUTPATIENT)
Dept: RADIOLOGY | Facility: HOSPITAL | Age: 85
Discharge: HOME | End: 2025-02-17
Attending: PHYSICIAN ASSISTANT
Payer: MEDICARE

## 2025-02-17 VITALS — HEIGHT: 61 IN | WEIGHT: 123.44 LBS | BODY MASS INDEX: 23.3 KG/M2

## 2025-02-17 DIAGNOSIS — Z12.31 SCREENING MAMMOGRAM, ENCOUNTER FOR: ICD-10-CM

## 2025-02-17 PROCEDURE — 77067 SCR MAMMO BI INCL CAD: CPT | Mod: TC,52
